# Patient Record
Sex: OTHER/UNKNOWN | Race: WHITE | NOT HISPANIC OR LATINO | Employment: UNEMPLOYED | URBAN - METROPOLITAN AREA
[De-identification: names, ages, dates, MRNs, and addresses within clinical notes are randomized per-mention and may not be internally consistent; named-entity substitution may affect disease eponyms.]

---

## 2019-01-18 ENCOUNTER — HOSPITAL ENCOUNTER (EMERGENCY)
Facility: OTHER | Age: 24
Discharge: HOME | End: 2019-01-18
Attending: EMERGENCY MEDICINE
Payer: MEDICAID

## 2019-01-18 VITALS
SYSTOLIC BLOOD PRESSURE: 121 MMHG | RESPIRATION RATE: 18 BRPM | HEART RATE: 89 BPM | HEIGHT: 67 IN | OXYGEN SATURATION: 99 % | TEMPERATURE: 98 F | DIASTOLIC BLOOD PRESSURE: 76 MMHG | WEIGHT: 159.38 LBS | BODY MASS INDEX: 25.01 KG/M2

## 2019-01-18 DIAGNOSIS — K62.89 PAIN, RECTAL: Primary | ICD-10-CM

## 2019-01-18 LAB
BILIRUB UR QL STRIP: NEGATIVE
CLARITY UR: CLEAR
COLOR UR: YELLOW
GLUCOSE UR QL STRIP: NEGATIVE
HGB UR QL STRIP: NEGATIVE
KETONES UR QL STRIP: NEGATIVE
LEUKOCYTE ESTERASE UR QL STRIP: NEGATIVE
NITRITE UR QL STRIP: NEGATIVE
PH UR STRIP: 6 [PH] (ref 5–8)
PROT UR QL STRIP: NEGATIVE
SP GR UR STRIP: <=1.005 (ref 1–1.03)
URN SPEC COLLECT METH UR: ABNORMAL
UROBILINOGEN UR STRIP-ACNC: NEGATIVE EU/DL

## 2019-01-18 PROCEDURE — 63600175 PHARM REV CODE 636 W HCPCS: Performed by: EMERGENCY MEDICINE

## 2019-01-18 PROCEDURE — 81003 URINALYSIS AUTO W/O SCOPE: CPT

## 2019-01-18 PROCEDURE — 25000003 PHARM REV CODE 250: Performed by: EMERGENCY MEDICINE

## 2019-01-18 PROCEDURE — 99283 EMERGENCY DEPT VISIT LOW MDM: CPT

## 2019-01-18 RX ORDER — QUETIAPINE FUMARATE 25 MG/1
TABLET, FILM COATED ORAL
Status: ON HOLD | COMMUNITY
End: 2019-06-19 | Stop reason: HOSPADM

## 2019-01-18 RX ORDER — IBUPROFEN 400 MG/1
400 TABLET ORAL
Status: COMPLETED | OUTPATIENT
Start: 2019-01-18 | End: 2019-01-18

## 2019-01-18 RX ORDER — FLUOXETINE HYDROCHLORIDE 40 MG/1
40 CAPSULE ORAL DAILY
COMMUNITY
End: 2019-06-13

## 2019-01-18 RX ORDER — AZITHROMYCIN 250 MG/1
1000 TABLET, FILM COATED ORAL
Status: COMPLETED | OUTPATIENT
Start: 2019-01-18 | End: 2019-01-18

## 2019-01-18 RX ORDER — ESTRADIOL 1 MG/1
2 TABLET ORAL 3 TIMES DAILY
COMMUNITY

## 2019-01-18 RX ORDER — CEFTRIAXONE 250 MG/1
250 INJECTION, POWDER, FOR SOLUTION INTRAMUSCULAR; INTRAVENOUS
Status: COMPLETED | OUTPATIENT
Start: 2019-01-18 | End: 2019-01-18

## 2019-01-18 RX ADMIN — AZITHROMYCIN 1000 MG: 250 TABLET, FILM COATED ORAL at 01:01

## 2019-01-18 RX ADMIN — IBUPROFEN 400 MG: 400 TABLET, FILM COATED ORAL at 01:01

## 2019-01-18 RX ADMIN — CEFTRIAXONE SODIUM 250 MG: 250 INJECTION, POWDER, FOR SOLUTION INTRAMUSCULAR; INTRAVENOUS at 01:01

## 2019-01-18 NOTE — ED PROVIDER NOTES
Encounter Date: 1/18/2019    SCRIBE #1 NOTE: I, Antonietta Kam, am scribing for, and in the presence of, Dr. Newton.       History     Chief Complaint   Patient presents with    rectal discharge     mucous discharge x a few weeks w/ some blood today and cloudy urine     Time seen by provider: 1:02 AM    This is a 23 y.o. male who presents with complaint of rectal discharge that began about two weeks ago. Patient reports rectal pain. She reports a small amount of rectal bleeding that began today. She reports pain worsens with bowel movements.She reports similar symptoms after having cut that became infected in the rectum. She denies fever, chills, abdominal pain, nausea, or vomiting. She notes being concerned about STD's. Of note, patient is taking hormone replacement therapy, and prefers to be called Arlin.       The history is provided by the patient.     Review of patient's allergies indicates:  No Known Allergies  Past Medical History:   Diagnosis Date    Depression     Hormone replacement therapy (HRT)      Past Surgical History:   Procedure Laterality Date    CHOLECYSTECTOMY       History reviewed. No pertinent family history.  Social History     Tobacco Use    Smoking status: Current Every Day Smoker    Smokeless tobacco: Never Used   Substance Use Topics    Alcohol use: No     Frequency: Never    Drug use: Yes     Types: Marijuana     Review of Systems   Constitutional: Negative for chills and fever.   HENT: Negative for sore throat.    Respiratory: Negative for cough and shortness of breath.    Cardiovascular: Negative for chest pain.   Gastrointestinal: Positive for anal bleeding and rectal pain. Negative for abdominal pain, nausea and vomiting.   Genitourinary: Negative for dysuria.   Musculoskeletal: Negative for back pain.   Skin: Negative for rash.   Neurological: Negative for weakness.       Physical Exam     Initial Vitals [01/18/19 0045]   BP Pulse Resp Temp SpO2   127/79 96 16 98.1 °F (36.7  °C) 99 %      MAP       --         Physical Exam    Nursing note and vitals reviewed.  Constitutional: He appears well-developed and well-nourished. No distress.   HENT:   Head: Normocephalic and atraumatic.   Mouth/Throat: Oropharynx is clear and moist.   Eyes: Conjunctivae and EOM are normal. Pupils are equal, round, and reactive to light.   Neck: Normal range of motion. Neck supple.   Cardiovascular: Normal rate, regular rhythm and normal heart sounds. Exam reveals no gallop and no friction rub.    No murmur heard.  Pulmonary/Chest: Breath sounds normal. No respiratory distress. He has no wheezes. He has no rhonchi. He has no rales.   Abdominal: Soft. There is no tenderness. There is no rebound and no guarding.   Genitourinary:   Genitourinary Comments: Rectal exam no tenderness. No rectal bleeding. Hemoccult negative.    Musculoskeletal: Normal range of motion. He exhibits no edema or tenderness.   Neurological: He is alert and oriented to person, place, and time.   Skin: Skin is warm and dry.   Psychiatric: He has a normal mood and affect. His behavior is normal. Judgment and thought content normal.         ED Course   Procedures  Labs Reviewed   URINALYSIS, REFLEX TO URINE CULTURE - Abnormal; Notable for the following components:       Result Value    Specific Gravity, UA <=1.005 (*)     All other components within normal limits    Narrative:     Preferred Collection Type->Urine, Clean Catch          Imaging Results    None          Medical Decision Making:   Initial Assessment:   Patient rectal discharge and concern for STD. Will treat with rocephin and Zithromax and recommend follow up with PCP.   Clinical Tests:   Lab Tests: Ordered and Reviewed  ED Management:  2:29 AM UA normal.               Attending Attestation:           Physician Attestation for Scribe:  Physician Attestation Statement for Scribe #1: I, Dr. Newton, reviewed documentation, as scribed by Antonietta Kam in my presence, and it is both  accurate and complete.                    Clinical Impression:     1. Pain, rectal                                 Walter Newton MD  01/18/19 4833

## 2019-01-18 NOTE — ED TRIAGE NOTES
"Pt arrived to ED with c/o mucous stool x several weeks. Pt states that he has had this in the past when he had an infection from a cut inside his rectum but the mucous is starting to turn yellow. Pt reports abdominal spasms when having a BM. Pt also reports "spots of blood" in stool earlier today. Pt denies fever, chills, n/v, sob, cp, dysuria.   "

## 2019-06-13 ENCOUNTER — HOSPITAL ENCOUNTER (EMERGENCY)
Facility: HOSPITAL | Age: 24
Discharge: PSYCHIATRIC OR SUBSTANCE USE FACILITY | End: 2019-06-13
Attending: EMERGENCY MEDICINE
Payer: MEDICAID

## 2019-06-13 ENCOUNTER — HOSPITAL ENCOUNTER (INPATIENT)
Facility: HOSPITAL | Age: 24
LOS: 6 days | Discharge: HOME | DRG: 885 | End: 2019-06-19
Attending: PSYCHIATRY & NEUROLOGY | Admitting: PSYCHIATRY & NEUROLOGY
Payer: MEDICAID

## 2019-06-13 VITALS
SYSTOLIC BLOOD PRESSURE: 127 MMHG | WEIGHT: 150 LBS | DIASTOLIC BLOOD PRESSURE: 62 MMHG | HEART RATE: 67 BPM | OXYGEN SATURATION: 97 % | HEIGHT: 67 IN | TEMPERATURE: 98 F | RESPIRATION RATE: 14 BRPM | BODY MASS INDEX: 23.54 KG/M2

## 2019-06-13 DIAGNOSIS — F19.10 POLYSUBSTANCE ABUSE: ICD-10-CM

## 2019-06-13 DIAGNOSIS — F17.210 CIGARETTE NICOTINE DEPENDENCE WITHOUT COMPLICATION: ICD-10-CM

## 2019-06-13 DIAGNOSIS — R45.89 SUICIDAL BEHAVIOR WITHOUT ATTEMPTED SELF-INJURY: Primary | ICD-10-CM

## 2019-06-13 DIAGNOSIS — F33.3 SEVERE EPISODE OF RECURRENT MAJOR DEPRESSIVE DISORDER, WITH PSYCHOTIC FEATURES: Primary | ICD-10-CM

## 2019-06-13 DIAGNOSIS — F32.A DEPRESSION WITH SUICIDAL IDEATION: ICD-10-CM

## 2019-06-13 DIAGNOSIS — R45.851 DEPRESSION WITH SUICIDAL IDEATION: ICD-10-CM

## 2019-06-13 DIAGNOSIS — Z78.9 MALE-TO-FEMALE TRANSGENDER PERSON: ICD-10-CM

## 2019-06-13 LAB
ALBUMIN SERPL BCP-MCNC: 3.9 G/DL (ref 3.5–5.2)
ALP SERPL-CCNC: 78 U/L (ref 55–135)
ALT SERPL W/O P-5'-P-CCNC: 11 U/L (ref 10–44)
AMPHET+METHAMPHET UR QL: NEGATIVE
ANION GAP SERPL CALC-SCNC: 8 MMOL/L (ref 8–16)
APAP SERPL-MCNC: <3 UG/ML (ref 10–20)
AST SERPL-CCNC: 13 U/L (ref 10–40)
BACTERIA #/AREA URNS AUTO: ABNORMAL /HPF
BARBITURATES UR QL SCN>200 NG/ML: NEGATIVE
BASOPHILS # BLD AUTO: 0.03 K/UL (ref 0–0.2)
BASOPHILS NFR BLD: 0.4 % (ref 0–1.9)
BENZODIAZ UR QL SCN>200 NG/ML: NEGATIVE
BILIRUB SERPL-MCNC: 0.4 MG/DL (ref 0.1–1)
BILIRUB UR QL STRIP: NEGATIVE
BUN SERPL-MCNC: 11 MG/DL (ref 6–20)
BZE UR QL SCN: NEGATIVE
CALCIUM SERPL-MCNC: 9.1 MG/DL (ref 8.7–10.5)
CANNABINOIDS UR QL SCN: NORMAL
CHLORIDE SERPL-SCNC: 104 MMOL/L (ref 95–110)
CLARITY UR REFRACT.AUTO: CLEAR
CO2 SERPL-SCNC: 27 MMOL/L (ref 23–29)
COLOR UR AUTO: YELLOW
CREAT SERPL-MCNC: 0.8 MG/DL (ref 0.5–1.4)
CREAT UR-MCNC: 105 MG/DL (ref 23–375)
DIFFERENTIAL METHOD: NORMAL
EOSINOPHIL # BLD AUTO: 0.1 K/UL (ref 0–0.5)
EOSINOPHIL NFR BLD: 1.2 % (ref 0–8)
ERYTHROCYTE [DISTWIDTH] IN BLOOD BY AUTOMATED COUNT: 13 % (ref 11.5–14.5)
EST. GFR  (AFRICAN AMERICAN): >60 ML/MIN/1.73 M^2
EST. GFR  (NON AFRICAN AMERICAN): >60 ML/MIN/1.73 M^2
ETHANOL SERPL-MCNC: <10 MG/DL
GLUCOSE SERPL-MCNC: 80 MG/DL (ref 70–110)
GLUCOSE UR QL STRIP: NEGATIVE
HCT VFR BLD AUTO: 43.7 % (ref 40–54)
HGB BLD-MCNC: 14.7 G/DL (ref 14–18)
HGB UR QL STRIP: NEGATIVE
IMM GRANULOCYTES # BLD AUTO: 0.02 K/UL (ref 0–0.04)
IMM GRANULOCYTES NFR BLD AUTO: 0.2 % (ref 0–0.5)
KETONES UR QL STRIP: NEGATIVE
LEUKOCYTE ESTERASE UR QL STRIP: ABNORMAL
LYMPHOCYTES # BLD AUTO: 3.1 K/UL (ref 1–4.8)
LYMPHOCYTES NFR BLD: 36.7 % (ref 18–48)
MCH RBC QN AUTO: 29.5 PG (ref 27–31)
MCHC RBC AUTO-ENTMCNC: 33.6 G/DL (ref 32–36)
MCV RBC AUTO: 88 FL (ref 82–98)
METHADONE UR QL SCN>300 NG/ML: NEGATIVE
MICROSCOPIC COMMENT: ABNORMAL
MONOCYTES # BLD AUTO: 0.6 K/UL (ref 0.3–1)
MONOCYTES NFR BLD: 7.1 % (ref 4–15)
NEUTROPHILS # BLD AUTO: 4.5 K/UL (ref 1.8–7.7)
NEUTROPHILS NFR BLD: 54.4 % (ref 38–73)
NITRITE UR QL STRIP: NEGATIVE
NRBC BLD-RTO: 0 /100 WBC
OPIATES UR QL SCN: NEGATIVE
PCP UR QL SCN>25 NG/ML: NEGATIVE
PH UR STRIP: 5 [PH] (ref 5–8)
PLATELET # BLD AUTO: 237 K/UL (ref 150–350)
PMV BLD AUTO: 10.7 FL (ref 9.2–12.9)
POTASSIUM SERPL-SCNC: 3.7 MMOL/L (ref 3.5–5.1)
PROT SERPL-MCNC: 6.9 G/DL (ref 6–8.4)
PROT UR QL STRIP: NEGATIVE
RBC # BLD AUTO: 4.99 M/UL (ref 4.6–6.2)
RBC #/AREA URNS AUTO: 0 /HPF (ref 0–4)
SODIUM SERPL-SCNC: 139 MMOL/L (ref 136–145)
SP GR UR STRIP: 1.01 (ref 1–1.03)
SQUAMOUS #/AREA URNS AUTO: 3 /HPF
TOXICOLOGY INFORMATION: NORMAL
TSH SERPL DL<=0.005 MIU/L-ACNC: 1.39 UIU/ML (ref 0.4–4)
URN SPEC COLLECT METH UR: ABNORMAL
WBC # BLD AUTO: 8.3 K/UL (ref 3.9–12.7)
WBC #/AREA URNS AUTO: 8 /HPF (ref 0–5)

## 2019-06-13 PROCEDURE — 99285 PR EMERGENCY DEPT VISIT,LEVEL V: ICD-10-PCS | Mod: ,,, | Performed by: EMERGENCY MEDICINE

## 2019-06-13 PROCEDURE — 99285 EMERGENCY DEPT VISIT HI MDM: CPT

## 2019-06-13 PROCEDURE — 85025 COMPLETE CBC W/AUTO DIFF WBC: CPT

## 2019-06-13 PROCEDURE — 80320 DRUG SCREEN QUANTALCOHOLS: CPT

## 2019-06-13 PROCEDURE — 80307 DRUG TEST PRSMV CHEM ANLYZR: CPT

## 2019-06-13 PROCEDURE — 84443 ASSAY THYROID STIM HORMONE: CPT

## 2019-06-13 PROCEDURE — 11400000 HC PSYCH PRIVATE ROOM

## 2019-06-13 PROCEDURE — 80053 COMPREHEN METABOLIC PANEL: CPT

## 2019-06-13 PROCEDURE — 80329 ANALGESICS NON-OPIOID 1 OR 2: CPT

## 2019-06-13 PROCEDURE — 81001 URINALYSIS AUTO W/SCOPE: CPT

## 2019-06-13 PROCEDURE — 99285 EMERGENCY DEPT VISIT HI MDM: CPT | Mod: ,,, | Performed by: EMERGENCY MEDICINE

## 2019-06-13 RX ORDER — HYDROXYZINE PAMOATE 50 MG/1
50 CAPSULE ORAL NIGHTLY PRN
Status: DISCONTINUED | OUTPATIENT
Start: 2019-06-13 | End: 2019-06-19 | Stop reason: HOSPADM

## 2019-06-13 RX ORDER — OLANZAPINE 10 MG/1
10 TABLET ORAL EVERY 8 HOURS PRN
Status: DISCONTINUED | OUTPATIENT
Start: 2019-06-13 | End: 2019-06-19 | Stop reason: HOSPADM

## 2019-06-13 RX ORDER — CARVEDILOL 12.5 MG/1
25 TABLET ORAL
Status: DISCONTINUED | OUTPATIENT
Start: 2019-06-13 | End: 2019-06-13

## 2019-06-13 RX ORDER — LOPERAMIDE HYDROCHLORIDE 2 MG/1
2 CAPSULE ORAL
Status: DISCONTINUED | OUTPATIENT
Start: 2019-06-13 | End: 2019-06-19 | Stop reason: HOSPADM

## 2019-06-13 RX ORDER — FLUOXETINE HYDROCHLORIDE 20 MG/1
20 CAPSULE ORAL DAILY
Status: ON HOLD | COMMUNITY
End: 2019-06-19 | Stop reason: HOSPADM

## 2019-06-13 RX ORDER — FOLIC ACID 1 MG/1
1 TABLET ORAL DAILY
Status: DISCONTINUED | OUTPATIENT
Start: 2019-06-14 | End: 2019-06-19 | Stop reason: HOSPADM

## 2019-06-13 RX ORDER — MAG HYDROX/ALUMINUM HYD/SIMETH 200-200-20
30 SUSPENSION, ORAL (FINAL DOSE FORM) ORAL EVERY 6 HOURS PRN
Status: DISCONTINUED | OUTPATIENT
Start: 2019-06-13 | End: 2019-06-19 | Stop reason: HOSPADM

## 2019-06-13 RX ORDER — OLANZAPINE 10 MG/2ML
10 INJECTION, POWDER, FOR SOLUTION INTRAMUSCULAR EVERY 8 HOURS PRN
Status: DISCONTINUED | OUTPATIENT
Start: 2019-06-13 | End: 2019-06-19 | Stop reason: HOSPADM

## 2019-06-13 RX ORDER — ACETAMINOPHEN 325 MG/1
650 TABLET ORAL EVERY 6 HOURS PRN
Status: DISCONTINUED | OUTPATIENT
Start: 2019-06-13 | End: 2019-06-19 | Stop reason: HOSPADM

## 2019-06-13 RX ORDER — DOCUSATE SODIUM 100 MG/1
100 CAPSULE, LIQUID FILLED ORAL DAILY PRN
Status: DISCONTINUED | OUTPATIENT
Start: 2019-06-13 | End: 2019-06-19 | Stop reason: HOSPADM

## 2019-06-13 RX ORDER — IBUPROFEN 200 MG
1 TABLET ORAL DAILY PRN
Status: DISCONTINUED | OUTPATIENT
Start: 2019-06-13 | End: 2019-06-19 | Stop reason: HOSPADM

## 2019-06-13 RX ORDER — LISINOPRIL 10 MG/1
10 TABLET ORAL
Status: DISCONTINUED | OUTPATIENT
Start: 2019-06-13 | End: 2019-06-13

## 2019-06-13 NOTE — ED NOTES
Patient laying on stretcher with significant other.  Calm and cooperative. Denies any pain or any other needs at this time. VSS.  Awaiting transport---ETA 6pm. Sitter at bedside in direct visual contact documenting per flow sheet.  Will continue to monitor.

## 2019-06-13 NOTE — ED TRIAGE NOTES
"Pt states that they have been having suicidal thoughts the past few days. States they have been having thoughts of hanging themselves, and have attempted to do so in the past, due to feel ing depressed and "felling like I'm about to crawl out of my skin". Pt girlfriend and girlfriends mother in room with pt.. Pt changed into blue scrubs, jewelry (ear rings and lip ring) placed into bag and given to girlfriends mother on pt request. Pt belongings placed in bag with pt label and put into storage area. Pt signed Notification of Rights and belongings inventory list. Pt in no acute distress at this time.   "

## 2019-06-13 NOTE — ED NOTES
Patient accepted by Deion Shaw at [facility] ([address], [place], [place]) for the service of Dr. Snell.  Report to be called to [phone].

## 2019-06-13 NOTE — ED NOTES
Admit packet faxed to [facility], [facility], [facility], and [facility].

## 2019-06-13 NOTE — ED NOTES
Patient laying on stretcher with significant other.  Calm and cooperative. Denies any pain or any other needs at this time.  Aware of placement at [facility].  Sitter at bedside in direct visual contact documenting per flow sheet.  Will continue to monitor.

## 2019-06-13 NOTE — ED NOTES
Pt remains with RN in triage at this time for 1:1 observation. Patient is calm and cooperative. A&Ox4 and following commands. Charge RN aware of patient.

## 2019-06-13 NOTE — ED NOTES
Patient laying on stretcher with significant other.  Calm and cooperative. Denies any pain or any other needs at this time.  Awaiting transport. Sitter at bedside in direct visual contact documenting per flow sheet.  Will continue to monitor.

## 2019-06-13 NOTE — ED NOTES
[facility] spoke with [facility] and was told a bed might be available at [facility] on [address].  Patient will require a psych consult.

## 2019-06-13 NOTE — ED PROVIDER NOTES
Encounter Date: 6/13/2019    SCRIBE #1 NOTE: I, Yessi Santiago, am scribing for, and in the presence of,  Dr. Nascimento. I have scribed the following portions of the note - Other sections scribed: HPI, ROS, PE, MDM.       History     Chief Complaint   Patient presents with    Suicidal     pt reports having thought to self harm for a few months. pt reports attempting to hang himself six months ago. pt reports having the same plan today. pt is calm and cooperative in triage.      Time patient was seen by the provider: 12:50 PM      The patient is a 23 y.o. male to female transgender with co-morbidities including: Hormone replacement, Depression who presents to the ED with a complaint of suicidal ideation. Reports having thoughts of self-harm for a few months, and had a previous suicide attempt 6 months ago, her plan then being hanging. She states she had the same plan for today; however, she does not want to put her partner or family through any pain. Denies pills or self-harm today. Physically she does not have any complaints at this time, denies fever, cough, abd pain, chest pain, shortness of breath. She is compliant with her medications and is taking Geodon and Prozac for her depression. No HI. Is not hearing voices today, but states she last heard them 3 days ago. No hallucinations.       The history is provided by the patient and medical records.     Review of patient's allergies indicates:  No Known Allergies  Past Medical History:   Diagnosis Date    Depression     Hormone replacement therapy (HRT)      Past Surgical History:   Procedure Laterality Date    CHOLECYSTECTOMY       No family history on file.  Social History     Tobacco Use    Smoking status: Current Every Day Smoker    Smokeless tobacco: Never Used   Substance Use Topics    Alcohol use: No     Frequency: Never    Drug use: Yes     Types: Marijuana     Review of Systems   Constitutional:  No Fever, No Chills,   Eyes: No Vision  Changes  ENT/Mouth: No sore throat, No rhinorrhea  Cardiovascular:  No Chest Pain, No Palpitations  Respiratory:  No Cough, No SOB  Gastrointestinal:  No Nausea, No Vomiting, No Diarrhea, No abdo pain.  Genitourinary:  No  pain, No dysuria   Musculoskeletal:  No Arthralgias, No Back Pain, No Neck Pain, No recent trauma.  Skin:  No skin Lesions  Neuro:  No Weakness, No Numbness, No Paresthesias, No Dizziness, No Headache  Psychiatric: Suicidal ideas    Physical Exam     Initial Vitals [06/13/19 1223]   BP Pulse Resp Temp SpO2   129/82 88 16 98.3 °F (36.8 °C) 97 %      MAP       --         Physical Exam    Nursing note and vitals reviewed.        Physical Exam:  GENERAL APPEARANCE: Well developed, well nourished, in no acute distress.  HENT: Normocephalic, atraumatic    EYES: Sclerae anicteric   NECK: Supple, no thyroid enlargement  CARDIOVASCULAR: Regular rate and rhythm without any murmurs, gallops, rubs.  LUNGS: Speaking in full sentences. Breathing comfortably. Auscultation of the lungs revealed normal breath sounds b/l  ABDOMEN: Soft and nontender, no masses, no rebound or guarding   NEUROLOGIC: Alert, interacting normally. No facial droop.   MSK: Moving all four extremities.  Skin: Warm and dry. No visible rash on exposed areas of skin.    Psych:  Flat affect, calm and cooperative.    ED Course   Procedures  Labs Reviewed   CBC W/ AUTO DIFFERENTIAL   COMPREHENSIVE METABOLIC PANEL   TSH   URINALYSIS, REFLEX TO URINE CULTURE   DRUG SCREEN PANEL, URINE EMERGENCY   ALCOHOL,MEDICAL (ETHANOL)   ACETAMINOPHEN LEVEL          Imaging Results    None          Medical Decision Making:   History:   Old Medical Records: I decided to obtain old medical records.  Old Records Summarized: records from clinic visits and records from previous admission(s).  Initial Assessment:   Suicidal with plan of hanging herself. No self-harm gestures at this time. Exam is benign. Patient is calm and cooperative. Will PEC and send screening  labs for medical clearance and transfer patient out as there are no beds at [facility].   Clinical Tests:   Lab Tests: Ordered and Reviewed  ED Management:  Update:  Labs are unremarkable. Vitals benign.  Patient is medically cleared at this time for psychiatric evaluation.  No beds available at [facility], is for transfer to outside facility.    MDM Complexity Points:   Problem Points:  1.New problem, with no additional ED work-up planned (maximum of 1) - suicidal ideation    Data Points:  Review or order clinical lab tests and Decision to obtain old records (in the EHR)    Risk:  High Risk              Scribe Attestation:   Scribe #1: I performed the above scribed service and the documentation accurately describes the services I performed. I attest to the accuracy of the note.               Clinical Impression:   No diagnosis found.                             Frank Nascimento MD  06/13/19 3711

## 2019-06-14 PROBLEM — F19.10 POLYSUBSTANCE ABUSE: Status: ACTIVE | Noted: 2019-06-14

## 2019-06-14 PROBLEM — Z78.9 MALE-TO-FEMALE TRANSGENDER PERSON: Status: ACTIVE | Noted: 2019-06-14

## 2019-06-14 PROBLEM — F17.210 CIGARETTE NICOTINE DEPENDENCE WITHOUT COMPLICATION: Status: ACTIVE | Noted: 2019-06-14

## 2019-06-14 LAB
CHOLEST SERPL-MCNC: 161 MG/DL (ref 120–199)
CHOLEST/HDLC SERPL: 2.9 {RATIO} (ref 2–5)
ESTIMATED AVG GLUCOSE: 103 MG/DL (ref 68–131)
HBA1C MFR BLD HPLC: 5.2 % (ref 4–5.6)
HDLC SERPL-MCNC: 56 MG/DL (ref 40–75)
HDLC SERPL: 34.8 % (ref 20–50)
LDLC SERPL CALC-MCNC: 89.8 MG/DL (ref 63–159)
NONHDLC SERPL-MCNC: 105 MG/DL
TRIGL SERPL-MCNC: 76 MG/DL (ref 30–150)

## 2019-06-14 PROCEDURE — 99223 PR INITIAL HOSPITAL CARE,LEVL III: ICD-10-PCS | Mod: ,,, | Performed by: NURSE PRACTITIONER

## 2019-06-14 PROCEDURE — 99223 1ST HOSP IP/OBS HIGH 75: CPT | Mod: ,,, | Performed by: NURSE PRACTITIONER

## 2019-06-14 PROCEDURE — 99223 PR INITIAL HOSPITAL CARE,LEVL III: ICD-10-PCS | Mod: SA,HB,S$PBB, | Performed by: PSYCHIATRY & NEUROLOGY

## 2019-06-14 PROCEDURE — 11400000 HC PSYCH PRIVATE ROOM

## 2019-06-14 PROCEDURE — 83036 HEMOGLOBIN GLYCOSYLATED A1C: CPT

## 2019-06-14 PROCEDURE — 90833 PR PSYCHOTHERAPY W/PATIENT W/E&M, 30 MIN (ADD ON): ICD-10-PCS | Mod: SA,HB,S$PBB, | Performed by: PSYCHIATRY & NEUROLOGY

## 2019-06-14 PROCEDURE — 80061 LIPID PANEL: CPT

## 2019-06-14 PROCEDURE — 99223 1ST HOSP IP/OBS HIGH 75: CPT | Mod: SA,HB,S$PBB, | Performed by: PSYCHIATRY & NEUROLOGY

## 2019-06-14 PROCEDURE — 90833 PSYTX W PT W E/M 30 MIN: CPT | Mod: SA,HB,S$PBB, | Performed by: PSYCHIATRY & NEUROLOGY

## 2019-06-14 PROCEDURE — 25000003 PHARM REV CODE 250: Performed by: PSYCHIATRY & NEUROLOGY

## 2019-06-14 PROCEDURE — 36415 COLL VENOUS BLD VENIPUNCTURE: CPT

## 2019-06-14 RX ORDER — SPIRONOLACTONE 25 MG/1
100 TABLET ORAL 3 TIMES DAILY
Status: DISCONTINUED | OUTPATIENT
Start: 2019-06-17 | End: 2019-06-15

## 2019-06-14 RX ORDER — ESTRADIOL 0.5 MG/1
2 TABLET ORAL 3 TIMES DAILY
Status: DISCONTINUED | OUTPATIENT
Start: 2019-06-17 | End: 2019-06-15

## 2019-06-14 RX ORDER — ZIPRASIDONE HYDROCHLORIDE 40 MG/1
40 CAPSULE ORAL 2 TIMES DAILY WITH MEALS
Status: DISCONTINUED | OUTPATIENT
Start: 2019-06-14 | End: 2019-06-18

## 2019-06-14 RX ORDER — FLUOXETINE HYDROCHLORIDE 20 MG/1
20 CAPSULE ORAL DAILY
Status: DISCONTINUED | OUTPATIENT
Start: 2019-06-14 | End: 2019-06-16

## 2019-06-14 RX ADMIN — FLUOXETINE 20 MG: 20 CAPSULE ORAL at 09:06

## 2019-06-14 RX ADMIN — ZIPRASIDONE HYDROCHLORIDE 40 MG: 40 CAPSULE ORAL at 04:06

## 2019-06-14 RX ADMIN — THERA TABS 1 TABLET: TAB at 08:06

## 2019-06-14 RX ADMIN — FOLIC ACID 1 MG: 1 TABLET ORAL at 08:06

## 2019-06-14 RX ADMIN — ZIPRASIDONE HYDROCHLORIDE 40 MG: 40 CAPSULE ORAL at 10:06

## 2019-06-14 NOTE — PLAN OF CARE
"Problem: Adult Behavioral Health Plan of Care  Goal: Patient-Specific Goal (Individualization)  Outcome: Ongoing (interventions implemented as appropriate)  Pt presented to [facility] with feeling suicidal and thinking of hanging self, pt PEC'D, accepted by Dr. Derain Snell, pt up to unit via security and spd  and attendee, property and body search performed and no paraphernalia found, pt flat affect and hesitent to make eye contact, unkempt, sad, pt states "having thoughts of harming myself because I have these voices in my head that tell me to harm myself" pt had previous suicide attempt 6 months ago her plan was to hang herself in garage, pt contracted for safety, pt PMX of Hormone replacement, Depression, states she is compliant with taking her Geodon and Prozac and her Estrogen therapy, pt given tobacco and substance abuse cessation education and handout, pt to receive outpatient referral upon discharge for tobacco and substance abuse cessation upon discharge, oriented pt to unit, gave supper tray, pt out on unit in activity room, safety precautions maintained, will continue to monitor        "

## 2019-06-14 NOTE — ASSESSMENT & PLAN NOTE
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

## 2019-06-14 NOTE — PROGRESS NOTES
"   06/14/19 1150   [facility] Group Therapy   Group Name Leisure Education  (1:1 with staff)   Specific Interventions Coping Skills Training  (defining leisure and benefits)   Participation Level Active;Sharing   Participation Quality Cooperative   Insight/Motivation Good   Affect/Mood Display Appropriate   Cognition Alert   Psychomotor WNL   Patient defined leisure as "to relax." Leisure means "painting, watching movies, relaxing with my partner." Patient states when he doesn't incorporate leisure into his life "I won't get to enjoy myself. It creates anxiety and stress."  "

## 2019-06-14 NOTE — ASSESSMENT & PLAN NOTE
Being treated with HRT  Aldactone and estradiol per home doses- need to verify with pharmacy that patient was taking these doses prior to admission   Discussed with Dr. Vance

## 2019-06-14 NOTE — PLAN OF CARE
Problem: Adult Behavioral Health Plan of Care  Goal: Plan of Care Review  Outcome: Ongoing (interventions implemented as appropriate)  Pt is cooperative and compliant with meds.  Still reports depression and anxiety.  Pt is disheveled.  Denies any current S/I or plan.  Somewhat withdrawn but will get out of bed when prompted.  Attending activity group at this time.  No acute distress apparent at this time, will continue to monitor.

## 2019-06-14 NOTE — PLAN OF CARE
Problem: Adult Behavioral Health Plan of Care  Goal: Optimized Coping Skills in Response to Life Stressors    Intervention: Promote Effective Coping Strategies  Behavior:  Patient did not attend psychotherapy group today. She was isolating in her room when invited to attend group.  met with her after group to check on her. She said that she was too tired to attend group.    Intervention:  Personal values were discussed in psychotherapy group this date and how personal values plays a part in patients lives. Patients identified values that are important to them using handout P/C/P - 7.1 from the Group Therapy for Substance Abuse, second edition, 2016. A Stages of Change Manual. Group discussion was had about patients values, behaviors that may not line up with their values, and how they might make some changes.    Response:  Patient did not attend psychotherapy group this date.    Plan:  To continue to encourage patient to attend group psychotherapy and to learn more about ways that they might change to live in more accord with their values.
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
## 2019-06-14 NOTE — PLAN OF CARE
Problem: Adult Behavioral Health Plan of Care  Goal: Rounds/Family Conference  TREATMENT TEAM UPDATE      Chief Complaint:  Patient was admitted due to suicidal ideation.      Current:  Patient is dressed in a hospital gown and looks very disheveled.      Plan:  Continue to assess and obtain collateral if patient is willing.

## 2019-06-14 NOTE — SUBJECTIVE & OBJECTIVE
Past Medical History:   Diagnosis Date    Depression     Hallucination     Hormone replacement therapy (HRT)        Past Surgical History:   Procedure Laterality Date    CHOLECYSTECTOMY         Review of patient's allergies indicates:  No Known Allergies    Current Facility-Administered Medications on File Prior to Encounter   Medication    [DISCONTINUED] carvedilol tablet 25 mg    [DISCONTINUED] lisinopril tablet 10 mg     Current Outpatient Medications on File Prior to Encounter   Medication Sig    estradiol (ESTRACE) 1 MG tablet Take 2 mg by mouth 3 (three) times daily.    FLUoxetine 20 MG capsule Take 20 mg by mouth once daily.    QUEtiapine (SEROQUEL) 25 MG Tab Take by mouth.    SPIRONOLACTONE ORAL Take 100 mg by mouth 3 (three) times daily.     Family History     None        Tobacco Use    Smoking status: Current Every Day Smoker     Packs/day: 0.50     Years: 5.00     Pack years: 2.50    Smokeless tobacco: Never Used   Substance and Sexual Activity    Alcohol use: No     Frequency: Never    Drug use: Yes     Types: Marijuana, Methamphetamines    Sexual activity: Not on file     Review of Systems   Constitutional: Negative for chills and fever.   HENT: Negative for congestion and sore throat.    Respiratory: Negative for cough, chest tightness and shortness of breath.    Cardiovascular: Negative for chest pain, palpitations and leg swelling.   Gastrointestinal: Negative for abdominal pain, diarrhea, nausea and vomiting.   Genitourinary: Negative for flank pain, frequency and hematuria.   Musculoskeletal: Negative for back pain and neck pain.   Skin: Negative for rash and wound.   Neurological: Negative for dizziness, seizures, syncope and headaches.   Psychiatric/Behavioral: Positive for dysphoric mood and suicidal ideas. Negative for agitation, confusion and self-injury.     Objective:     Vital Signs (Most Recent):  Temp: 97.1 °F (36.2 °C) (06/14/19 1356)  Pulse: (!) 58 (06/14/19 1356)  Resp:  18 (06/14/19 1356)  BP: (!) 85/50 (06/14/19 1356) Vital Signs (24h Range):  Temp:  [96.1 °F (35.6 °C)-97.7 °F (36.5 °C)] 97.1 °F (36.2 °C)  Pulse:  [52-73] 58  Resp:  [14-18] 18  SpO2:  [97 %] 97 %  BP: ()/(50-65) 85/50     Weight: 74.8 kg (164 lb 14.5 oz)  Body mass index is 25.83 kg/m².    Physical Exam   Constitutional: He is oriented to person, place, and time. He appears well-developed and well-nourished. No distress.   HENT:   Head: Normocephalic and atraumatic.   Eyes: Pupils are equal, round, and reactive to light.   Neck: No thyromegaly present.   Cardiovascular: Normal rate, regular rhythm, normal heart sounds and intact distal pulses.   No murmur heard.  Pulmonary/Chest: Effort normal and breath sounds normal. No respiratory distress. He has no wheezes. He has no rales.   Abdominal: Soft. Bowel sounds are normal. He exhibits no distension and no mass. There is no tenderness.   Musculoskeletal: Normal range of motion. He exhibits no edema or deformity.   Lymphadenopathy:     He has no cervical adenopathy.   Neurological: He is alert and oriented to person, place, and time.   CN II visual fields full to confrontation  CN III, Iv, VI- pupils ERRL   CN III- no palsy  Nystagmus; none   Diplopia- none  ophthalmoparesis- none  CN V- facial sensation intact  CN VII- facial expression full and symmetric  CNVIII- normal  CN IV-Normal  CN X- normal  CN XI- Normal   CN XII normal      Skin: Skin is warm and dry. No rash noted. No erythema.   Nursing note and vitals reviewed.      Significant Labs:   UPT  No results found for this or any previous visit.  U/A  No results found for this or any previous visit.  UDS  Results for orders placed or performed during the hospital encounter of 06/13/19   Drug screen panel, emergency   Result Value Ref Range    Benzodiazepines Negative     Methadone metabolites Negative     Cocaine (Metab.) Negative     Opiate Scrn, Ur Negative     Barbiturate Screen, Ur Negative      Amphetamine Screen, Ur Negative     THC Presumptive Positive     Phencyclidine Negative     Creatinine, Random Ur 105.0 23.0 - 375.0 mg/dL    Toxicology Information SEE COMMENT      CBC  Results for orders placed or performed during the hospital encounter of 06/13/19   CBC auto differential   Result Value Ref Range    WBC 8.30 3.90 - 12.70 K/uL    RBC 4.99 4.60 - 6.20 M/uL    Hemoglobin 14.7 14.0 - 18.0 g/dL    Hematocrit 43.7 40.0 - 54.0 %    Mean Corpuscular Volume 88 82 - 98 fL    Mean Corpuscular Hemoglobin 29.5 27.0 - 31.0 pg    Mean Corpuscular Hemoglobin Conc 33.6 32.0 - 36.0 g/dL    RDW 13.0 11.5 - 14.5 %    Platelets 237 150 - 350 K/uL    MPV 10.7 9.2 - 12.9 fL    Immature Granulocytes 0.2 0.0 - 0.5 %    Gran # (ANC) 4.5 1.8 - 7.7 K/uL    Immature Grans (Abs) 0.02 0.00 - 0.04 K/uL    Lymph # 3.1 1.0 - 4.8 K/uL    Mono # 0.6 0.3 - 1.0 K/uL    Eos # 0.1 0.0 - 0.5 K/uL    Baso # 0.03 0.00 - 0.20 K/uL    nRBC 0 0 /100 WBC    Gran% 54.4 38.0 - 73.0 %    Lymph% 36.7 18.0 - 48.0 %    Mono% 7.1 4.0 - 15.0 %    Eosinophil% 1.2 0.0 - 8.0 %    Basophil% 0.4 0.0 - 1.9 %    Differential Method Automated      CMP  Results for orders placed or performed during the hospital encounter of 06/13/19   Comprehensive metabolic panel   Result Value Ref Range    Sodium 139 136 - 145 mmol/L    Potassium 3.7 3.5 - 5.1 mmol/L    Chloride 104 95 - 110 mmol/L    CO2 27 23 - 29 mmol/L    Glucose 80 70 - 110 mg/dL    BUN, Bld 11 6 - 20 mg/dL    Creatinine 0.8 0.5 - 1.4 mg/dL    Calcium 9.1 8.7 - 10.5 mg/dL    Total Protein 6.9 6.0 - 8.4 g/dL    Albumin 3.9 3.5 - 5.2 g/dL    Total Bilirubin 0.4 0.1 - 1.0 mg/dL    Alkaline Phosphatase 78 55 - 135 U/L    AST 13 10 - 40 U/L    ALT 11 10 - 44 U/L    Anion Gap 8 8 - 16 mmol/L    eGFR if African American >60.0 >60 mL/min/1.73 m^2    eGFR if non African American >60.0 >60 mL/min/1.73 m^2     TSH  Results for orders placed or performed during the hospital encounter of 06/13/19   TSH   Result  Value Ref Range    TSH 1.388 0.400 - 4.000 uIU/mL     ETOH  Results for orders placed or performed during the hospital encounter of 06/13/19   Ethanol   Result Value Ref Range    Alcohol, Medical, Serum <10 <10 mg/dL     Salicylate  No results found for this or any previous visit.  Acetaminophen  Results for orders placed or performed during the hospital encounter of 06/13/19   Acetaminophen level   Result Value Ref Range    Acetaminophen (Tylenol), Serum <3.0 (L) 10.0 - 20.0 ug/mL             Significant Imaging: none

## 2019-06-14 NOTE — CONSULTS
[facility] Medicine  Consult Note    Patient Name: Nicola Valdez  MRN: [id number]  Admission Date: 6/13/2019  Hospital Length of Stay: 1 days  Attending Physician: Derian Snell MD   Primary Care Provider: Primary Doctor No           Patient information was obtained from patient and ER records.     Inpatient consult to [facility] for History and Physical  Consult performed by: Nani Harrison NP  Consult ordered by: Derian Snell MD        Subjective:     Principal Problem: <principal problem not specified>    Chief Complaint: No chief complaint on file.       HPI: Nicola Valdez is a 23 y.o. male with a past psychiatric history of depression, currently admitted to the inpatient unit with the following chief complaint: suicidal ideation  UDS + THC  Apparently transitioning to female with HRT ; pt not very talkative and difficult to get information but reports was taking estradiol and aldactone   Patient is from [facility] and has been here for about six months.  She was homeless there so he came here.  She works as a  and lives with his girlfriend and her mother.  She was recently using methamphetamine and marijuana.      Past Medical History:   Diagnosis Date    Depression     Hallucination     Hormone replacement therapy (HRT)        Past Surgical History:   Procedure Laterality Date    CHOLECYSTECTOMY         Review of patient's allergies indicates:  No Known Allergies    Current Facility-Administered Medications on File Prior to Encounter   Medication    [DISCONTINUED] carvedilol tablet 25 mg    [DISCONTINUED] lisinopril tablet 10 mg     Current Outpatient Medications on File Prior to Encounter   Medication Sig    estradiol (ESTRACE) 1 MG tablet Take 2 mg by mouth 3 (three) times daily.    FLUoxetine 20 MG capsule Take 20 mg by mouth once daily.    QUEtiapine (SEROQUEL) 25 MG Tab Take by mouth.    SPIRONOLACTONE ORAL Take 100 mg by mouth 3  (three) times daily.     Family History     None        Tobacco Use    Smoking status: Current Every Day Smoker     Packs/day: 0.50     Years: 5.00     Pack years: 2.50    Smokeless tobacco: Never Used   Substance and Sexual Activity    Alcohol use: No     Frequency: Never    Drug use: Yes     Types: Marijuana, Methamphetamines    Sexual activity: Not on file     Review of Systems   Constitutional: Negative for chills and fever.   HENT: Negative for congestion and sore throat.    Respiratory: Negative for cough, chest tightness and shortness of breath.    Cardiovascular: Negative for chest pain, palpitations and leg swelling.   Gastrointestinal: Negative for abdominal pain, diarrhea, nausea and vomiting.   Genitourinary: Negative for flank pain, frequency and hematuria.   Musculoskeletal: Negative for back pain and neck pain.   Skin: Negative for rash and wound.   Neurological: Negative for dizziness, seizures, syncope and headaches.   Psychiatric/Behavioral: Positive for dysphoric mood and suicidal ideas. Negative for agitation, confusion and self-injury.     Objective:     Vital Signs (Most Recent):  Temp: 97.1 °F (36.2 °C) (06/14/19 1356)  Pulse: (!) 58 (06/14/19 1356)  Resp: 18 (06/14/19 1356)  BP: (!) 85/50 (06/14/19 1356) Vital Signs (24h Range):  Temp:  [96.1 °F (35.6 °C)-97.7 °F (36.5 °C)] 97.1 °F (36.2 °C)  Pulse:  [52-73] 58  Resp:  [14-18] 18  SpO2:  [97 %] 97 %  BP: ()/(50-65) 85/50     Weight: 74.8 kg (164 lb 14.5 oz)  Body mass index is 25.83 kg/m².    Physical Exam   Constitutional: He is oriented to person, place, and time. He appears well-developed and well-nourished. No distress.   HENT:   Head: Normocephalic and atraumatic.   Eyes: Pupils are equal, round, and reactive to light.   Neck: No thyromegaly present.   Cardiovascular: Normal rate, regular rhythm, normal heart sounds and intact distal pulses.   No murmur heard.  Pulmonary/Chest: Effort normal and breath sounds normal. No  respiratory distress. He has no wheezes. He has no rales.   Abdominal: Soft. Bowel sounds are normal. He exhibits no distension and no mass. There is no tenderness.   Musculoskeletal: Normal range of motion. He exhibits no edema or deformity.   Lymphadenopathy:     He has no cervical adenopathy.   Neurological: He is alert and oriented to person, place, and time.   CN II visual fields full to confrontation  CN III, Iv, VI- pupils ERRL   CN III- no palsy  Nystagmus; none   Diplopia- none  ophthalmoparesis- none  CN V- facial sensation intact  CN VII- facial expression full and symmetric  CNVIII- normal  CN IV-Normal  CN X- normal  CN XI- Normal   CN XII normal      Skin: Skin is warm and dry. No rash noted. No erythema.   Nursing note and vitals reviewed.      Significant Labs:   UPT  No results found for this or any previous visit.  U/A  No results found for this or any previous visit.  UDS  Results for orders placed or performed during the hospital encounter of 06/13/19   Drug screen panel, emergency   Result Value Ref Range    Benzodiazepines Negative     Methadone metabolites Negative     Cocaine (Metab.) Negative     Opiate Scrn, Ur Negative     Barbiturate Screen, Ur Negative     Amphetamine Screen, Ur Negative     THC Presumptive Positive     Phencyclidine Negative     Creatinine, Random Ur 105.0 23.0 - 375.0 mg/dL    Toxicology Information SEE COMMENT      CBC  Results for orders placed or performed during the hospital encounter of 06/13/19   CBC auto differential   Result Value Ref Range    WBC 8.30 3.90 - 12.70 K/uL    RBC 4.99 4.60 - 6.20 M/uL    Hemoglobin 14.7 14.0 - 18.0 g/dL    Hematocrit 43.7 40.0 - 54.0 %    Mean Corpuscular Volume 88 82 - 98 fL    Mean Corpuscular Hemoglobin 29.5 27.0 - 31.0 pg    Mean Corpuscular Hemoglobin Conc 33.6 32.0 - 36.0 g/dL    RDW 13.0 11.5 - 14.5 %    Platelets 237 150 - 350 K/uL    MPV 10.7 9.2 - 12.9 fL    Immature Granulocytes 0.2 0.0 - 0.5 %    Gran # (ANC) 4.5 1.8 -  7.7 K/uL    Immature Grans (Abs) 0.02 0.00 - 0.04 K/uL    Lymph # 3.1 1.0 - 4.8 K/uL    Mono # 0.6 0.3 - 1.0 K/uL    Eos # 0.1 0.0 - 0.5 K/uL    Baso # 0.03 0.00 - 0.20 K/uL    nRBC 0 0 /100 WBC    Gran% 54.4 38.0 - 73.0 %    Lymph% 36.7 18.0 - 48.0 %    Mono% 7.1 4.0 - 15.0 %    Eosinophil% 1.2 0.0 - 8.0 %    Basophil% 0.4 0.0 - 1.9 %    Differential Method Automated      CMP  Results for orders placed or performed during the hospital encounter of 06/13/19   Comprehensive metabolic panel   Result Value Ref Range    Sodium 139 136 - 145 mmol/L    Potassium 3.7 3.5 - 5.1 mmol/L    Chloride 104 95 - 110 mmol/L    CO2 27 23 - 29 mmol/L    Glucose 80 70 - 110 mg/dL    BUN, Bld 11 6 - 20 mg/dL    Creatinine 0.8 0.5 - 1.4 mg/dL    Calcium 9.1 8.7 - 10.5 mg/dL    Total Protein 6.9 6.0 - 8.4 g/dL    Albumin 3.9 3.5 - 5.2 g/dL    Total Bilirubin 0.4 0.1 - 1.0 mg/dL    Alkaline Phosphatase 78 55 - 135 U/L    AST 13 10 - 40 U/L    ALT 11 10 - 44 U/L    Anion Gap 8 8 - 16 mmol/L    eGFR if African American >60.0 >60 mL/min/1.73 m^2    eGFR if non African American >60.0 >60 mL/min/1.73 m^2     TSH  Results for orders placed or performed during the hospital encounter of 06/13/19   TSH   Result Value Ref Range    TSH 1.388 0.400 - 4.000 uIU/mL     ETOH  Results for orders placed or performed during the hospital encounter of 06/13/19   Ethanol   Result Value Ref Range    Alcohol, Medical, Serum <10 <10 mg/dL     Salicylate  No results found for this or any previous visit.  Acetaminophen  Results for orders placed or performed during the hospital encounter of 06/13/19   Acetaminophen level   Result Value Ref Range    Acetaminophen (Tylenol), Serum <3.0 (L) 10.0 - 20.0 ug/mL             Significant Imaging: none    Assessment/Plan:     Male-to-female transgender person    Being treated with HRT  Aldactone and estradiol per home doses- need to verify with pharmacy that patient was taking these doses prior to admission   Discussed with  Dr. 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 I will sign off. Please contact us if you have any additional questions.    Nani Harrison NP  Department of Hospital Medicine   [facility]
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
## 2019-06-14 NOTE — HPI
Nicola Valdez is a 23 y.o. male with a past psychiatric history of depression, currently admitted to the inpatient unit with the following chief complaint: suicidal ideation  UDS + THC  Apparently transitioning to female with HRT ; pt not very talkative and difficult to get information but reports was taking estradiol and aldactone   Patient is from [facility] and has been here for about six months.  She was homeless there so he came here.  She works as a  and lives with his girlfriend and her mother.  She was recently using methamphetamine and marijuana.

## 2019-06-15 PROCEDURE — 99233 PR SUBSEQUENT HOSPITAL CARE,LEVL III: ICD-10-PCS | Mod: SA,HB,S$PBB, | Performed by: PSYCHIATRY & NEUROLOGY

## 2019-06-15 PROCEDURE — 11400000 HC PSYCH PRIVATE ROOM

## 2019-06-15 PROCEDURE — 25000003 PHARM REV CODE 250: Performed by: PSYCHIATRY & NEUROLOGY

## 2019-06-15 PROCEDURE — 99233 SBSQ HOSP IP/OBS HIGH 50: CPT | Mod: SA,HB,S$PBB, | Performed by: PSYCHIATRY & NEUROLOGY

## 2019-06-15 RX ORDER — ESTRADIOL 0.5 MG/1
2 TABLET ORAL 3 TIMES DAILY
Status: DISCONTINUED | OUTPATIENT
Start: 2019-06-15 | End: 2019-06-19 | Stop reason: HOSPADM

## 2019-06-15 RX ORDER — SPIRONOLACTONE 25 MG/1
100 TABLET ORAL 3 TIMES DAILY
Status: DISCONTINUED | OUTPATIENT
Start: 2019-06-15 | End: 2019-06-19 | Stop reason: HOSPADM

## 2019-06-15 RX ADMIN — FLUOXETINE 20 MG: 20 CAPSULE ORAL at 08:06

## 2019-06-15 RX ADMIN — THERA TABS 1 TABLET: TAB at 08:06

## 2019-06-15 RX ADMIN — SPIRONOLACTONE 100 MG: 25 TABLET ORAL at 08:06

## 2019-06-15 RX ADMIN — ZIPRASIDONE HYDROCHLORIDE 40 MG: 40 CAPSULE ORAL at 04:06

## 2019-06-15 RX ADMIN — ZIPRASIDONE HYDROCHLORIDE 40 MG: 40 CAPSULE ORAL at 08:06

## 2019-06-15 RX ADMIN — FOLIC ACID 1 MG: 1 TABLET ORAL at 08:06

## 2019-06-15 RX ADMIN — ESTRADIOL 2 MG: 0.5 TABLET ORAL at 08:06

## 2019-06-15 NOTE — PLAN OF CARE
Problem: Adult Behavioral Health Plan of Care  Goal: Plan of Care Review  Outcome: Ongoing (interventions implemented as appropriate)  Pt.  Slept 8  Hours  so far this shift without problems noted. q 15 min safety checks done this shift. Safety and comfort maintained. Cont. Plan.

## 2019-06-15 NOTE — PLAN OF CARE
"Problem: Adult Behavioral Health Plan of Care  Goal: Develops/Participates in Therapeutic [place] to Support Successful Transition    Intervention: [facility]rapeutic [place]  Collateral Contact Note:      Patient's significant other was contacted and his name is Leonid Randle; he was reached at [phone]. Patient's significant other stated that he did not know why the patient came to the hospital; patient stated that she just needed to go the hospital for attention and help.     Patient's significant other stated that he is unaware of any previous outpatient or inpatient treatment dates; they have been together for only six months. Patient's significant other has only had depression for six months in the opinion; he agreed that he ha sonly known the patient for ix months however.     Patient's significant other stated categorically that he does not feel the patient has had any substance abuse issues. Patient's significant other stated that the patient has no legal issues. Patient checked herself in for help with depression he stated.     Patient's significant other stated that in his opinion there have been no suicide attempts in the past. Patient has no history of violence he stated.     There are no guns or weapons in the home.     Patient stays well if the patient is working and around the mother of the significant other. 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     Alejandro Maurice stated that he plans to  the patient when the patient is discharged and will assist the patient in getting to any after-care appointments scheduled through [facility].         "

## 2019-06-15 NOTE — PLAN OF CARE
"Problem: Adult Behavioral Health Plan of Care  Goal: Develops/Participates in Therapeutic [place] to Support Successful Transition    Intervention: Mutually Develop Transition Plan  Patient stated that she will go with [facility] for after-care; patient was given a choice and stated that she thinks she has gone to [facility] in the past but is unsure but will "consult" wit her spouse about it. Patient stated that she plans to get back to the staff when she obtains clarity. Patient continues to not want this counselor to contact her spouse who is Alejandro Randle whose phone number is [phone].             "

## 2019-06-15 NOTE — PSYCH
Reviewed patient's statement about attempting to commit suicide by hanging herself while at her mother's house last year and the patient's attempts at cutting her arm this morning while in her room. Dr. Channing MD met with the patient this morning to assess her progress in treatment.

## 2019-06-15 NOTE — PSYCH
At this time, patient refused to give permission to contact Robbin Randle, her significant other. Patient agreed to think about it and reconsider at another time. She wants her confidentiality maintained.
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
## 2019-06-15 NOTE — PROGRESS NOTES
PSYCHIATRY DAILY INPATIENT PROGRESS NOTE  SUBSEQUENT HOSPITAL VISIT    ENCOUNTER DATE: 6/15/2019  SITE: [facility]    DATE OF ADMISSION: 6/13/2019  6:52 PM  LENGTH OF STAY: 2 days      HISTORY    CHIEF COMPLAINT   Nicola Valdez is a 23 y.o. male, seen during daily watters rounds on the inpatient unit.  Nicola Valdez presents with the chief complaint of suicidal ideation    HPI   (Elements: Location, Quality, Severity, Duration, Timing, Content, Modifying Factors, Associated Signs & Symptoms)    The patient was seen and examined. The chart was reviewed.     Staff reports no behavioral or management issues.     The patient has been compliant with treatment. The patient denied any side effects.    This morning patient was found to have a marker in her room.  This was removed.  She was also seen doing self harm cuts with plastic from water.  We discussed this as not being a suicide attempt but poor cutting.  She did not read IHYDLM or Calm books.  We discussed need to be on 1:1.  She contracted for safety and I do not believe she was suicidal with her self harm behavior.      Endorses Symptoms of Depression: +diminished mood or loss of interest/anhedonia irritability, +diminished energy, change in sleep, change in appetite, +diminished concentration or cognition or indecisiveness, PMA/R, +excessive guilt or hopelessness or worthlessness, suicidal ideations     Endorses Changes in Sleep: +trouble with initiation, maintenance, early morning awakening with inability to return to sleep, hypersomnolence      Endorses Suicidal/Homicidal ideations: +active/passive ideations, organized plans, future intentions     Has had a plan to hang self     Symptoms of psychosis: hallucinations, delusions, disorganized thinking, disorganized behavior or abnormal motor behavior, or negative symptoms (diminshed emotional expression, avolition, anhedonia, alogia, asociality)      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 but  possibly MDD    Endorses Symptoms of JOVANI: excessive anxiety/worry/fear, more days than not, about numerous issues, difficult to control, with restlessness, fatigue, poor concentration, irritability, muscle tension, sleep disturbance; causes functionally impairing distress      Endorses Symptoms of Panic Disorder: recurrent panic attacks (palpitations/heart racing, sweating, shakiness, dyspnea, choking, chest pain/discomfort, Gi symptoms, dizzy/lightheadedness, hot/col flashes, paresthesias, derealization, fear of losing control or fear of dying), precipitated or un-precipitated, source of worry and/or behavioral changes secondary, +with or without agoraphobia           Endorses Symptoms of PTSD: +h/o trauma; +re-experiencing/intrusive symptoms, avoidant behavior, negative alterations in cognition or mood, hyperarousal symptoms; with or without dissociative symptoms      Denies Symptoms of Eating Disorders: anorexia, bulimia or binging     Endorses Substance Use: +Tolerance, +Withdrawal, Loss of control, +Social / Occupational Consequence,  +Risky / Continued Use, State of Change (pre-contemplative, +contemplative, preparation, action, maintenance, termination)  Does not believe marijuana is affecting her negatively but wants to stop meth     Borderline Personality Disorder Screen     Efforts to avoid real or imagined abandonment, Pattern of intense and unstable relationships, Distorted and unstable self-image or sense of self, Impulsive and often dangerous behaviors, Self harming, such as cutting, Recurring thoughts of suicidal behaviors or threats, Intense and highly changeable moods, Chronic feelings of emptiness, Inappropriate, intense anger or problems controlling anger, Difficulty trusting, fear of others intentions and Feelings of dissociation    ROS  Review of systems  Constitutional: No recent change in weight or fever  Musculoskeletal: No back, neck, muscle, or joint pain  Respiratory: No cough or shortness  "of breath  Cardiovascular: No chest pain, palpitations, or leg swelling  Gastrointestinal: No abdominal pain, nausea vomiting, or diarrhea  Genitourinary: No pain on urination  Neurologic: No dizziness, seizures, or headaches  Eyes: No change in vision  HENT: No congestion, hearing problem, tinnitus, dysphagia, or sore throat  Skin: No rash or wound    PAST MEDICAL HISTORY   Past Medical History:   Diagnosis Date    Anxiety 2008    Patient first got the diagnosis at age 13.     Depression 2018    Patient stated, "I received this diagnosis with [facility].     Hallucination 2018    Patient stated, "I was on drugs all the time, auditory hallucinations."     History of psychiatric hospitalization 2018    Was first hospitalized in [place], [place].     Hormone replacement therapy (HRT)     Hx of psychiatric care 2018    Patient was first put on wellbutrin and seroquel; was changed from wellutrin to celexa.    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     Sleep difficulties 2017    Been diagnosed with sleeping disturbance only six months ago.     Substance abuse 2017    Patient has a problem with meth at this time.     Suicide attempt 2018    First suicide attempt was last year.     Withdrawal symptoms, drug or narcotic 2017    Patient stated she has had [facility] issues with meth.            PSYCHOTROPIC MEDICATIONS   Scheduled Meds:   [START ON 6/17/2019] estradiol  2 mg Oral TID    FLUoxetine  20 mg Oral Daily    folic acid  1 mg Oral Daily    multivitamin  1 tablet Oral Daily    [START ON 6/17/2019] spironolactone  100 mg Oral TID    ziprasidone  40 mg Oral BID WM     Continuous Infusions:  PRN " "Meds:.acetaminophen, aluminum-magnesium hydroxide-simethicone, docusate sodium, hydrOXYzine pamoate, loperamide, nicotine, OLANZapine **AND** OLANZapine        EXAMINATION    VITALS   Vitals:    06/15/19 0900   BP: (!) 94/51   Pulse: 64   Resp: 18   Temp: 98.4 °F (36.9 °C)       CONSTITUTIONAL  General Appearance: Colored hair; NAD     MUSCULOSKELETAL  Muscle Strength and Tone:  normal  Abnormal Involuntary Movements:  none  Gait and Station:  normal; non-ataxic     PSYCHIATRIC   Level of Consciousness: awake, alert  Orientation: p/p/t/s  Grooming:  adequate to circumstances  Psychomotor Behavior: no PMA/R  Speech: nl r/t/v/s  Language: able to repeat words English fluent  Mood: "sad"  Affect: dysphoric  Thought Process:  linear and organized  Associations:  intact; no SOTO  Thought Content: +SI AH denied VH/delusions; denied HI  Memory:  intact to recent and remote events  Attention:  intact to conversation; not distractible   Fund of Knowledge:  age and education appropriate  Estimate if Intelligence:  average based on work/education history, vocabulary and mental status exam  Insight:  good- seeks help  Judgment:   good- no bx issues, compliant and cooperative        DIAGNOSTIC TESTING   Laboratory Results  No results found for this or any previous visit (from the past 24 hour(s)).      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  JOVANI  Panic with agoraphobia  PTSD  BPD     Polysubstance use including marijuana methamphetamine   Nicotine dependence     Transitioning to female with HRT    PROBLEM LIST AND MANAGEMENT PLANS        PRESCRIPTION DRUG MANAGEMENT  Compliance: yes  Side Effects: no  Regimen Adjustments:      Depression - Counseled, Restart Prozac 20mg QDay with plan to titrate higher, patient unaware of recent dose, Geodon 20mg BID with plan to titrate higher     Anxiety - Counseled, Prozac     PTSD - Counseled, Prozaac     BPD- [facility] book and " counseling     HRT - medicine consult reviewed, HRT medicine restarted     Polysubstance use including marijuana methamphetamine  - counseled with motivational interviewing, does not      Nicotine dependence - counseled, replacement    DISCHARGE PLANNING  -SW to assist with aftercare planning and collateral  -Once stable discharge home with outpatient follow up care and/or rehab  -Continue inpatient treatment under a PEC and/or CEC for danger to self, and danger to others, and grave disability as evident by voiced suicidal ideation in the context of mood personality and substance problems      NEED FOR CONTINUED HOSPITALIZATION  Psychiatric illness continues to pose a potential threat to life or bodily function, of self or others, thereby requiring the need for continued inpatient psychiatric hospitalization: Yes    Protective inpatient pyschiatric hospitalization required while a safe disposition plan is enacted: Yes    Patient stabilized and ready for discharge from inpatient psychiatric unit: No      STAFF:   Derian Snell MD  Psychiatry

## 2019-06-15 NOTE — PLAN OF CARE
Problem: Adult Behavioral Health Plan of Care  Goal: Plan of Care Review  Outcome: Ongoing (interventions implemented as appropriate)  Pt seen walking to day room with face covered.Pt using material from a paper scrub to cover her face.Pt reluctant to show her face while in bed.Pt tearful and not talking much.Pt noticed to have superficial scratches to left arm.When asked about this pt reports she used the plastic water container top to scratch self.Pt reports she doesn't like the way she looks.Pt with depressed hopeless affect.Dr Snell notified.

## 2019-06-15 NOTE — NURSING
"Received pt. In room, lying in bed. Pt. Report, mood as ,"ok". Minimal interaction at this time due to pt. Resting. No problems noted at this time,  "

## 2019-06-16 PROCEDURE — 99233 PR SUBSEQUENT HOSPITAL CARE,LEVL III: ICD-10-PCS | Mod: SA,HB,S$PBB, | Performed by: PSYCHIATRY & NEUROLOGY

## 2019-06-16 PROCEDURE — 25000003 PHARM REV CODE 250: Performed by: PSYCHIATRY & NEUROLOGY

## 2019-06-16 PROCEDURE — 99233 SBSQ HOSP IP/OBS HIGH 50: CPT | Mod: SA,HB,S$PBB, | Performed by: PSYCHIATRY & NEUROLOGY

## 2019-06-16 PROCEDURE — 11400000 HC PSYCH PRIVATE ROOM

## 2019-06-16 RX ORDER — FLUOXETINE HYDROCHLORIDE 20 MG/1
40 CAPSULE ORAL DAILY
Status: DISCONTINUED | OUTPATIENT
Start: 2019-06-16 | End: 2019-06-19 | Stop reason: HOSPADM

## 2019-06-16 RX ADMIN — SPIRONOLACTONE 100 MG: 25 TABLET ORAL at 03:06

## 2019-06-16 RX ADMIN — SPIRONOLACTONE 100 MG: 25 TABLET ORAL at 08:06

## 2019-06-16 RX ADMIN — ESTRADIOL 2 MG: 0.5 TABLET ORAL at 08:06

## 2019-06-16 RX ADMIN — ZIPRASIDONE HYDROCHLORIDE 40 MG: 40 CAPSULE ORAL at 07:06

## 2019-06-16 RX ADMIN — ESTRADIOL 2 MG: 0.5 TABLET ORAL at 03:06

## 2019-06-16 RX ADMIN — FOLIC ACID 1 MG: 1 TABLET ORAL at 08:06

## 2019-06-16 RX ADMIN — THERA TABS 1 TABLET: TAB at 08:06

## 2019-06-16 RX ADMIN — FLUOXETINE 40 MG: 20 CAPSULE ORAL at 08:06

## 2019-06-16 RX ADMIN — ZIPRASIDONE HYDROCHLORIDE 40 MG: 40 CAPSULE ORAL at 04:06

## 2019-06-16 RX ADMIN — DOCUSATE SODIUM 100 MG: 100 CAPSULE, LIQUID FILLED ORAL at 09:06

## 2019-06-16 NOTE — PLAN OF CARE
Problem: Adult Behavioral Health Plan of Care  Goal: Plan of Care Review  Outcome: Ongoing (interventions implemented as appropriate)  Pt out on unit off and on today.Pt reports sleeping well last night.Appetite good.Pt's grooming is poor.Pt not covering face today and no crying spells.Pt able to smile at times.Pt contracts for safety with staff.Denies any thoughts of hurting self.Medication compliant.

## 2019-06-16 NOTE — PROGRESS NOTES
PSYCHIATRY DAILY INPATIENT PROGRESS NOTE  SUBSEQUENT HOSPITAL VISIT    ENCOUNTER DATE: 6/16/2019  SITE: [facility]    DATE OF ADMISSION: 6/13/2019  6:52 PM  LENGTH OF STAY: 3 days      HISTORY    CHIEF COMPLAINT   Nicola Valdez is a 23 y.o. male, seen during daily watters rounds on the inpatient unit.  Nicola Valdez presents with the chief complaint of suicidal ideation    HPI   (Elements: Location, Quality, Severity, Duration, Timing, Content, Modifying Factors, Associated Signs & Symptoms)    The patient was seen and examined. The chart was reviewed.     Staff reports no behavioral or management issues.     The patient has been compliant with treatment. The patient denied any side effects.    Patient with no self harm last night but continues to have ideation.  She read some of the [facility] book and found it helpful.      Continued Symptoms of Depression: +diminished mood or loss of interest/anhedonia irritability, +diminished energy, change in sleep, change in appetite, +diminished concentration or cognition or indecisiveness, PMA/R, +excessive guilt or hopelessness or worthlessness, suicidal ideations     Continued Changes in Sleep: +trouble with initiation, maintenance, early morning awakening with inability to return to sleep, hypersomnolence      Continued Suicidal/Homicidal ideations: +active/passive ideations, organized plans, future intentions     Has had a plan to hang self     Symptoms of psychosis: hallucinations, delusions, disorganized thinking, disorganized behavior or abnormal motor behavior, or negative symptoms (diminshed emotional expression, avolition, anhedonia, alogia, asociality)      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 but possibly MDD    Continued Symptoms of JOVANI: excessive anxiety/worry/fear, more days than not, about numerous issues, difficult to control, with restlessness, fatigue, poor concentration, irritability, muscle tension, sleep disturbance; causes functionally  impairing distress      Continued Symptoms of Panic Disorder: recurrent panic attacks (palpitations/heart racing, sweating, shakiness, dyspnea, choking, chest pain/discomfort, Gi symptoms, dizzy/lightheadedness, hot/col flashes, paresthesias, derealization, fear of losing control or fear of dying), precipitated or un-precipitated, source of worry and/or behavioral changes secondary, +with or without agoraphobia           Continued Symptoms of PTSD: +h/o trauma; +re-experiencing/intrusive symptoms, avoidant behavior, negative alterations in cognition or mood, hyperarousal symptoms; with or without dissociative symptoms      Denies Symptoms of Eating Disorders: anorexia, bulimia or binging     Endorses Substance Use: +Tolerance, +Withdrawal, Loss of control, +Social / Occupational Consequence,  +Risky / Continued Use, State of Change (pre-contemplative, +contemplative, preparation, action, maintenance, termination)  Does not believe marijuana is affecting her negatively but wants to stop meth     Borderline Personality Disorder Screen     Efforts to avoid real or imagined abandonment, Pattern of intense and unstable relationships, Distorted and unstable self-image or sense of self, Impulsive and often dangerous behaviors, Self harming, such as cutting, Recurring thoughts of suicidal behaviors or threats, Intense and highly changeable moods, Chronic feelings of emptiness, Inappropriate, intense anger or problems controlling anger, Difficulty trusting, fear of others intentions and Feelings of dissociation    ROS  Review of systems  Constitutional: No recent change in weight or fever  Musculoskeletal: No back, neck, muscle, or joint pain  Respiratory: No cough or shortness of breath  Cardiovascular: No chest pain, palpitations, or leg swelling  Gastrointestinal: No abdominal pain, nausea vomiting, or diarrhea  Genitourinary: No pain on urination  Neurologic: No dizziness, seizures, or headaches  Eyes: No change in  "vision  HENT: No congestion, hearing problem, tinnitus, dysphagia, or sore throat  Skin: No rash or wound    PAST MEDICAL HISTORY   Past Medical History:   Diagnosis Date    Anxiety 2008    Patient first got the diagnosis at age 13.     Depression 2018    Patient stated, "I received this diagnosis with [facility].     Hallucination 2018    Patient stated, "I was on drugs all the time, auditory hallucinations."     History of psychiatric hospitalization 2018    Was first hospitalized in [place], [place].     Hormone replacement therapy (HRT)     Hx of psychiatric care 2018    Patient was first put on wellbutrin and seroquel; was changed from wellutrin to celexa.    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     Sleep difficulties 2017    Been diagnosed with sleeping disturbance only six months ago.     Substance abuse 2017    Patient has a problem with meth at this time.     Suicide attempt 2018    First suicide attempt was last year.     Withdrawal symptoms, drug or narcotic 2017    Patient stated she has had [facility] issues with meth.            PSYCHOTROPIC MEDICATIONS   Scheduled Meds:   estradiol  2 mg Oral TID    FLUoxetine  20 mg Oral Daily    folic acid  1 mg Oral Daily    multivitamin  1 tablet Oral Daily    spironolactone  100 mg Oral TID    ziprasidone  40 mg Oral BID WM     Continuous Infusions:  PRN Meds:.acetaminophen, aluminum-magnesium hydroxide-simethicone, docusate sodium, hydrOXYzine pamoate, loperamide, nicotine, OLANZapine **AND** OLANZapine        EXAMINATION    VITALS   Vitals:    06/16/19 0739   BP: 123/76   Pulse: 66   Resp: 16   Temp: 98.1 °F (36.7 °C) " "      CONSTITUTIONAL  General Appearance: Colored hair; NAD     MUSCULOSKELETAL  Muscle Strength and Tone:  normal  Abnormal Involuntary Movements:  none  Gait and Station:  normal; non-ataxic     PSYCHIATRIC   Level of Consciousness: awake, alert  Orientation: p/p/t/s  Grooming:  adequate to circumstances  Psychomotor Behavior: no PMA/R  Speech: nl r/t/v/s  Language: able to repeat words English fluent  Mood: "doing ok"  Affect: somewhat less dysphoric  Thought Process:  linear and organized  Associations:  intact; no SOTO  Thought Content: +SI AH denied VH/delusions; denied HI  Memory:  intact to recent and remote events  Attention:  intact to conversation; not distractible   Fund of Knowledge:  age and education appropriate  Estimate if Intelligence:  average based on work/education history, vocabulary and mental status exam  Insight:  good- seeks help  Judgment:   good- no bx issues, compliant and cooperative        DIAGNOSTIC TESTING   Laboratory Results  No results found for this or any previous visit (from the past 24 hour(s)).      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  JOVANI  Panic with agoraphobia  PTSD  BPD     Polysubstance use including marijuana methamphetamine   Nicotine dependence     Transitioning to female with HRT    PROBLEM LIST AND MANAGEMENT PLANS        PRESCRIPTION DRUG MANAGEMENT  Compliance: yes  Side Effects: no  Regimen Adjustments:      Depression - Increase Prozac 40mg QDay with plan to titrate higher, patient unaware of recent dose, Geodon 20mg BID with plan to titrate higher     Anxiety - Counseled, Prozac     PTSD - Counseled, Prozaac     BPD- [facility] book and counseling     HRT - medicine consult reviewed, HRT medicine restarted     Polysubstance use including marijuana methamphetamine  - counseled with motivational interviewing, does not      Nicotine dependence - counseled, replacement    DISCHARGE PLANNING  - to assist with " aftercare planning and collateral  -Once stable discharge home with outpatient follow up care and/or rehab  -Continue inpatient treatment under a PEC and/or CEC for danger to self, and danger to others, and grave disability as evident by voiced suicidal ideation in the context of mood personality and substance problems      NEED FOR CONTINUED HOSPITALIZATION  Psychiatric illness continues to pose a potential threat to life or bodily function, of self or others, thereby requiring the need for continued inpatient psychiatric hospitalization: Yes    Protective inpatient pyschiatric hospitalization required while a safe disposition plan is enacted: Yes    Patient stabilized and ready for discharge from inpatient psychiatric unit: No      STAFF:   Derian Snell MD  Psychiatry

## 2019-06-16 NOTE — PLAN OF CARE
"Problem: Adult Behavioral Health Plan of Care  Goal: Plan of Care Review  Outcome: Ongoing (interventions implemented as appropriate)  Received pt. Lying in bed at start of the shift with eyes closed. Pt. Awakened to assess for self harm. Noted superficial cuts to lt. Forearm. Pt. Remained isolative to her room. Pt. Spoke about being, "kicked" out by both parents  Homes, " I lived in the streets in [place] and prostituted myself out to survive." Pt. States she moved to [place] Due to being [place]. Pt. Does not have a plan yet on obtaining employment.  Self care and self love discussed. Pt. Also stated she does not like her face, that is why she covers it. Reassurance and support provided. Subject of self esteem esteem discussed. Pt. Agreed to contact staff if having thoughts to harm self. Compliant with meds. Pt.  Slept 9.5  Hours  so far this shift without problems noted. q 15 min safety checks done this shift.No self harm behavior noted. Safety and comfort maintained. Cont. Plan.      "

## 2019-06-17 LAB
BILIRUB UR QL STRIP: NEGATIVE
CLARITY UR: CLEAR
COLOR UR: YELLOW
GLUCOSE UR QL STRIP: NEGATIVE
HGB UR QL STRIP: NEGATIVE
KETONES UR QL STRIP: NEGATIVE
LEUKOCYTE ESTERASE UR QL STRIP: NEGATIVE
NITRITE UR QL STRIP: NEGATIVE
PH UR STRIP: 6 [PH] (ref 5–8)
PROT UR QL STRIP: NEGATIVE
SP GR UR STRIP: 1.01 (ref 1–1.03)
URN SPEC COLLECT METH UR: NORMAL
UROBILINOGEN UR STRIP-ACNC: NEGATIVE EU/DL

## 2019-06-17 PROCEDURE — 81003 URINALYSIS AUTO W/O SCOPE: CPT

## 2019-06-17 PROCEDURE — 99233 PR SUBSEQUENT HOSPITAL CARE,LEVL III: ICD-10-PCS | Mod: SA,HB,S$PBB, | Performed by: PSYCHIATRY & NEUROLOGY

## 2019-06-17 PROCEDURE — 11400000 HC PSYCH PRIVATE ROOM

## 2019-06-17 PROCEDURE — 99233 SBSQ HOSP IP/OBS HIGH 50: CPT | Mod: SA,HB,S$PBB, | Performed by: PSYCHIATRY & NEUROLOGY

## 2019-06-17 PROCEDURE — 25000003 PHARM REV CODE 250: Performed by: PSYCHIATRY & NEUROLOGY

## 2019-06-17 RX ADMIN — ESTRADIOL 2 MG: 0.5 TABLET ORAL at 02:06

## 2019-06-17 RX ADMIN — SPIRONOLACTONE 100 MG: 25 TABLET ORAL at 08:06

## 2019-06-17 RX ADMIN — FLUOXETINE 40 MG: 20 CAPSULE ORAL at 08:06

## 2019-06-17 RX ADMIN — ZIPRASIDONE HYDROCHLORIDE 40 MG: 40 CAPSULE ORAL at 04:06

## 2019-06-17 RX ADMIN — SPIRONOLACTONE 100 MG: 25 TABLET ORAL at 02:06

## 2019-06-17 RX ADMIN — ESTRADIOL 2 MG: 0.5 TABLET ORAL at 08:06

## 2019-06-17 RX ADMIN — FOLIC ACID 1 MG: 1 TABLET ORAL at 08:06

## 2019-06-17 RX ADMIN — ZIPRASIDONE HYDROCHLORIDE 40 MG: 40 CAPSULE ORAL at 07:06

## 2019-06-17 RX ADMIN — THERA TABS 1 TABLET: TAB at 08:06

## 2019-06-17 NOTE — NURSING
Pt. In room at start of the shift lying in bed. During one to one pt. Denied self harm thoughts, no self harm behavior noted. Mood and affect, brighter, was happy she had visitors today. Pt. Out for snack and HS meds. Pt. Was given clothes from [employer] bin, was thankful. No problems noted at this time.

## 2019-06-17 NOTE — PROGRESS NOTES
"   06/17/19 0229   [facility] Group Therapy   Group Name Other  (Expression All About Me, participated in 1:1 with staff)   Specific Interventions Skilled Activity Self-Expression   Participation Level Active   Participation Quality Cooperative   Insight/Motivation Good   Affect/Mood Display Appropriate   Cognition Alert   Psychomotor WNL   Patient shared that the following expressions about himself: I worry about "MY FACE", I dream about "MY ATTACKER", I am proud of "MY PARTNER", I am afraid of "LOOKING UGLY", I don't believe in "GENDER", I am good at "ART", I am poor at "BEING HAPPY", I feel like "crying." Patient chose to color coloring sheets after self-expressions activity.  "

## 2019-06-17 NOTE — PLAN OF CARE
Problem: Adult Behavioral Health Plan of Care  Goal: Plan of Care Review  Outcome: Ongoing (interventions implemented as appropriate)  Pt has no further episodes of self-injury.  Denies any current S/I or H/I.  Encouraged pt to report any self-injury impulses to staff immediately.  Pt is disheveled.  Pt verbalized understanding.  Will continue to monitor mood and behavior.

## 2019-06-17 NOTE — PROGRESS NOTES
PSYCHIATRY DAILY INPATIENT PROGRESS NOTE  SUBSEQUENT HOSPITAL VISIT    ENCOUNTER DATE: 6/17/2019  SITE: [facility]    DATE OF ADMISSION: 6/13/2019  6:52 PM  LENGTH OF STAY: 4 days      HISTORY    CHIEF COMPLAINT   Nicola Valdez is a 23 y.o. male, seen during daily watters rounds on the inpatient unit.  Nicola Valdez presents with the chief complaint of suicidal ideation    HPI   (Elements: Location, Quality, Severity, Duration, Timing, Content, Modifying Factors, Associated Signs & Symptoms)    The patient was seen and examined. The chart was reviewed.     Staff reports no behavioral or management issues.     The patient has been compliant with treatment. The patient denied any side effects.    Patient with no self harm last night but continues to have ideation.  She read some of the [facility] book and found it helpful.  She is now on chapter four.  Improved thoughts of self harm, no suicidal ideation in the past 24 hours.  She had a problem with urination yesterday but did not resport this to me today.      Improving Symptoms of Depression: less diminished mood or loss of interest/anhedonia irritability, improved diminished energy, change in sleep, change in appetite, +diminished concentration or cognition or indecisiveness, PMA/R, less excessive guilt or hopelessness or worthlessness, suicidal ideations     Continued Changes in Sleep: +trouble with initiation, maintenance, early morning awakening with inability to return to sleep, hypersomnolence      Resolving Suicidal/Homicidal ideations: less active/passive ideations, organized plans, future intentions     Has had a plan to hang self but no longer with plan to kill self or do self harm     Symptoms of psychosis: hallucinations, delusions, disorganized thinking, disorganized behavior or abnormal motor behavior, or negative symptoms (diminshed emotional expression, avolition, anhedonia, alogia, asociality)      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  but possibly MDD.  This has resolved     Improving Symptoms of JOVANI: less excessive anxiety/worry/fear, more days than not, about numerous issues, difficult to control, with restlessness, fatigue, poor concentration, irritability, muscle tension, sleep disturbance; causes functionally impairing distress      Continued Symptoms of Panic Disorder: recurrent panic attacks (palpitations/heart racing, sweating, shakiness, dyspnea, choking, chest pain/discomfort, Gi symptoms, dizzy/lightheadedness, hot/col flashes, paresthesias, derealization, fear of losing control or fear of dying), precipitated or un-precipitated, source of worry and/or behavioral changes secondary, +with or without agoraphobia           Continued Symptoms of PTSD: +h/o trauma; +re-experiencing/intrusive symptoms, avoidant behavior, negative alterations in cognition or mood, hyperarousal symptoms; with or without dissociative symptoms      Denies Symptoms of Eating Disorders: anorexia, bulimia or binging     Endorses Substance Use: +Tolerance, +Withdrawal, Loss of control, +Social / Occupational Consequence,  +Risky / Continued Use, State of Change (pre-contemplative, +contemplative, preparation, action, maintenance, termination)  Does not believe marijuana is affecting her negatively but wants to stop meth     Borderline Personality Disorder Screen     Efforts to avoid real or imagined abandonment, Pattern of intense and unstable relationships, Distorted and unstable self-image or sense of self, Impulsive and often dangerous behaviors, Self harming, such as cutting, Recurring thoughts of suicidal behaviors or threats, Intense and highly changeable moods, Chronic feelings of emptiness, Inappropriate, intense anger or problems controlling anger, Difficulty trusting, fear of others intentions and Feelings of dissociation    ROS  Review of systems  Constitutional: No recent change in weight or fever  Musculoskeletal: No back, neck, muscle, or joint  "pain  Respiratory: No cough or shortness of breath  Cardiovascular: No chest pain, palpitations, or leg swelling  Gastrointestinal: No abdominal pain, nausea vomiting, or diarrhea  Genitourinary: No pain on urination  Neurologic: No dizziness, seizures, or headaches  Eyes: No change in vision  HENT: No congestion, hearing problem, tinnitus, dysphagia, or sore throat  Skin: No rash or wound    PAST MEDICAL HISTORY   Past Medical History:   Diagnosis Date    Anxiety 2008    Patient first got the diagnosis at age 13.     Depression 2018    Patient stated, "I received this diagnosis with [facility].     Hallucination 2018    Patient stated, "I was on drugs all the time, auditory hallucinations."     History of psychiatric hospitalization 2018    Was first hospitalized in [place], [place].     Hormone replacement therapy (HRT)     Hx of psychiatric care 2018    Patient was first put on wellbutrin and seroquel; was changed from wellutrin to celexa.    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     Sleep difficulties 2017    Been diagnosed with sleeping disturbance only six months ago.     Substance abuse 2017    Patient has a problem with meth at this time.     Suicide attempt 2018    First suicide attempt was last year.     Withdrawal symptoms, drug or narcotic 2017    Patient stated she has had [facility] issues with meth.            PSYCHOTROPIC MEDICATIONS   Scheduled Meds:   estradiol  2 mg Oral TID    FLUoxetine  40 mg Oral Daily    folic acid  1 mg Oral Daily    multivitamin  1 tablet Oral Daily    spironolactone  100 mg Oral TID    ziprasidone  40 mg Oral BID WM     Continuous Infusions:  PRN " "Meds:.acetaminophen, aluminum-magnesium hydroxide-simethicone, docusate sodium, hydrOXYzine pamoate, loperamide, nicotine, OLANZapine **AND** OLANZapine        EXAMINATION    VITALS   Vitals:    06/17/19 0751   BP: (!) 117/55   Pulse: 70   Resp: 18   Temp: 98.2 °F (36.8 °C)       CONSTITUTIONAL  General Appearance: Colored hair; NAD     MUSCULOSKELETAL  Muscle Strength and Tone:  normal  Abnormal Involuntary Movements:  none  Gait and Station:  normal; non-ataxic     PSYCHIATRIC   Level of Consciousness: awake, alert  Orientation: p/p/t/s  Grooming:  adequate to circumstances  Psychomotor Behavior: no PMA/R  Speech: nl r/t/v/s  Language: able to repeat words English fluent  Mood: "better"  Affect: less dysphoric, improved range  Thought Process:  linear and organized  Associations:  intact; no SOTO  Thought Content: no SI no AH denied VH/delusions; denied HI  Memory:  intact to recent and remote events  Attention:  intact to conversation; not distractible   Fund of Knowledge:  age and education appropriate  Estimate if Intelligence:  average based on work/education history, vocabulary and mental status exam  Insight:  good- seeks help  Judgment:   good- no bx issues, compliant and cooperative        DIAGNOSTIC TESTING   Laboratory Results  No results found for this or any previous visit (from the past 24 hour(s)).      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  JOVANI  Panic with agoraphobia  PTSD  BPD     Polysubstance use including marijuana methamphetamine   Nicotine dependence     Transitioning to female with HRT    PROBLEM LIST AND MANAGEMENT PLANS        PRESCRIPTION DRUG MANAGEMENT  Compliance: yes  Side Effects: no  Regimen Adjustments:      Depression - Increase Prozac 40mg QDay with plan to titrate higher, patient unaware of recent dose, Geodon 20mg BID with plan to titrate higher     Anxiety - Counseled, Prozac     PTSD - Counseled, Prozaac     BPD- [facility] " book and counseling     HRT - medicine consult reviewed, HRT medicine restarted     Polysubstance use including marijuana methamphetamine  - counseled with motivational interviewing, does not      Nicotine dependence - counseled, replacement    DISCHARGE PLANNING  -SW to assist with aftercare planning and collateral  -Once stable discharge home with outpatient follow up care and/or rehab  -Continue inpatient treatment under a PEC and/or CEC for danger to self, and danger to others, and grave disability as evident by voiced suicidal ideation in the context of mood personality and substance problems      NEED FOR CONTINUED HOSPITALIZATION  Psychiatric illness continues to pose a potential threat to life or bodily function, of self or others, thereby requiring the need for continued inpatient psychiatric hospitalization: Yes    Protective inpatient pyschiatric hospitalization required while a safe disposition plan is enacted: Yes    Patient stabilized and ready for discharge from inpatient psychiatric unit: No      STAFF:   Derian Snell MD  Psychiatry

## 2019-06-17 NOTE — PLAN OF CARE
Problem: Adult Behavioral Health Plan of Care  Goal: Plan of Care Review  Outcome: Ongoing (interventions implemented as appropriate)  Pt.  Slept 7.5  Hours  so far this shift. No further complaints verbalized.. q 15 min safety checks done this shift. Safety and comfort maintained. Cont. Plan.

## 2019-06-17 NOTE — PLAN OF CARE
"Problem: Adult Behavioral Health Plan of Care  Goal: Optimized Coping Skills in Response to Life Stressors    Intervention: Promote Effective Coping Strategies  Group Note        Behavior    Patient attended group psychotherapy today. Patient exhibited depressed mood with appropriate affect.       Intervention     CBT-based group psychotherapy today focused on the development of internal coping strategies to confront depression, addiction or behavior unbecoming of a patient who desires to carry out life principles.         Response    Patient stated, "I want to pass please." Patient was allowed to pass; he stated he would prefer to listen to the discussion. Patient did exhibit good listening skills. Patient was attentive throughout the session. Patient was able to clap appropriately and on cue during the affirmation component of group today. While the patient stated he wanted to pass, he approached this counselor and apologized for not wanting to participate in the role playing scenario; stated he was not ready. Patient was excused, as per his request, from participating in the role playing scenario. Patient was very polite and respect as well as very attentive when the other group members gave their presentations.         Plan    Patient will be invited to continue to attend group psychotherapy sessions with continued focused attention on internal coping strategies.         "

## 2019-06-17 NOTE — NURSING
"Pt. Noted in bathroom several times. Pt. C/o difficulty urinating, having to"strain" Pt. Report having problems for approx one week and thought she might have a UTI. Pt. Was able to void/ empty bladder. Pt. Informed doctor would be made aware of it.  "

## 2019-06-18 PROCEDURE — 25000003 PHARM REV CODE 250: Performed by: PSYCHIATRY & NEUROLOGY

## 2019-06-18 PROCEDURE — 99233 SBSQ HOSP IP/OBS HIGH 50: CPT | Mod: SA,HB,S$PBB, | Performed by: PSYCHIATRY & NEUROLOGY

## 2019-06-18 PROCEDURE — 11400000 HC PSYCH PRIVATE ROOM

## 2019-06-18 PROCEDURE — 99233 PR SUBSEQUENT HOSPITAL CARE,LEVL III: ICD-10-PCS | Mod: SA,HB,S$PBB, | Performed by: PSYCHIATRY & NEUROLOGY

## 2019-06-18 RX ORDER — ZIPRASIDONE HYDROCHLORIDE 40 MG/1
40 CAPSULE ORAL
Status: DISCONTINUED | OUTPATIENT
Start: 2019-06-18 | End: 2019-06-19 | Stop reason: HOSPADM

## 2019-06-18 RX ADMIN — ESTRADIOL 2 MG: 0.5 TABLET ORAL at 08:06

## 2019-06-18 RX ADMIN — DOCUSATE SODIUM 100 MG: 100 CAPSULE, LIQUID FILLED ORAL at 06:06

## 2019-06-18 RX ADMIN — SPIRONOLACTONE 100 MG: 25 TABLET ORAL at 02:06

## 2019-06-18 RX ADMIN — ESTRADIOL 2 MG: 0.5 TABLET ORAL at 02:06

## 2019-06-18 RX ADMIN — ZIPRASIDONE HYDROCHLORIDE 40 MG: 40 CAPSULE ORAL at 04:06

## 2019-06-18 RX ADMIN — THERA TABS 1 TABLET: TAB at 08:06

## 2019-06-18 RX ADMIN — ZIPRASIDONE HYDROCHLORIDE 40 MG: 40 CAPSULE ORAL at 08:06

## 2019-06-18 RX ADMIN — SPIRONOLACTONE 100 MG: 25 TABLET ORAL at 08:06

## 2019-06-18 RX ADMIN — FOLIC ACID 1 MG: 1 TABLET ORAL at 08:06

## 2019-06-18 RX ADMIN — FLUOXETINE 40 MG: 20 CAPSULE ORAL at 08:06

## 2019-06-18 NOTE — PROGRESS NOTES
PSYCHIATRY DAILY INPATIENT PROGRESS NOTE  SUBSEQUENT HOSPITAL VISIT    ENCOUNTER DATE: 6/18/2019  SITE: [facility]    DATE OF ADMISSION: 6/13/2019  6:52 PM  LENGTH OF STAY: 5 days      HISTORY    CHIEF COMPLAINT   Nicola Valdez is a 23 y.o. male, seen during daily watters rounds on the inpatient unit.  Nicola Valdez presents with the chief complaint of suicidal ideation    HPI   (Elements: Location, Quality, Severity, Duration, Timing, Content, Modifying Factors, Associated Signs & Symptoms)    The patient was seen and examined. The chart was reviewed.     Staff reports no behavioral or management issues.     The patient has been compliant with treatment. The patient denied any side effects.    Patient without self harm the past 48 hours.  She read through [employer] and is now reading the calm book.  These books seem to be helpful and she is responding to therapy.  She remains isolative and guarded while affect is improving.      Improving Symptoms of Depression: less diminished mood or loss of interest/anhedonia irritability, improved diminished energy, change in sleep, change in appetite, +diminished concentration or cognition or indecisiveness, PMA/R, less excessive guilt or hopelessness or worthlessness, suicidal ideations     Continued Changes in Sleep: +trouble with initiation, maintenance, early morning awakening with inability to return to sleep, hypersomnolence      Resolving Suicidal/Homicidal ideations: less active/passive ideations, organized plans, future intentions     Has had a plan to hang self but no longer with plan to kill self or do self harm     Symptoms of psychosis: hallucinations, delusions, disorganized thinking, disorganized behavior or abnormal motor behavior, or negative symptoms (diminshed emotional expression, avolition, anhedonia, alogia, asociality)      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 but possibly MDD.  This has resolved     Improving Symptoms of JOVANI: less  excessive anxiety/worry/fear, more days than not, about numerous issues, difficult to control, with restlessness, fatigue, poor concentration, irritability, muscle tension, sleep disturbance; causes functionally impairing distress      Continued Symptoms of Panic Disorder: recurrent panic attacks (palpitations/heart racing, sweating, shakiness, dyspnea, choking, chest pain/discomfort, Gi symptoms, dizzy/lightheadedness, hot/col flashes, paresthesias, derealization, fear of losing control or fear of dying), precipitated or un-precipitated, source of worry and/or behavioral changes secondary, +with or without agoraphobia    Continued Symptoms of PTSD: +h/o trauma; +re-experiencing/intrusive symptoms, avoidant behavior, negative alterations in cognition or mood, hyperarousal symptoms; with or without dissociative symptoms      Denies Symptoms of Eating Disorders: anorexia, bulimia or binging     Endorses Substance Use: +Tolerance, +Withdrawal, Loss of control, +Social / Occupational Consequence,  +Risky / Continued Use, State of Change (pre-contemplative, +contemplative, preparation, action, maintenance, termination)  Does not believe marijuana is affecting her negatively but wants to stop meth     Borderline Personality Disorder Screen     Efforts to avoid real or imagined abandonment, Pattern of intense and unstable relationships, Distorted and unstable self-image or sense of self, Impulsive and often dangerous behaviors, Self harming, such as cutting, Recurring thoughts of suicidal behaviors or threats, Intense and highly changeable moods, Chronic feelings of emptiness, Inappropriate, intense anger or problems controlling anger, Difficulty trusting, fear of others intentions and Feelings of dissociation    ROS  Review of systems  Constitutional: No recent change in weight or fever  Musculoskeletal: No back, neck, muscle, or joint pain  Respiratory: No cough or shortness of breath  Cardiovascular: No chest pain,  "palpitations, or leg swelling  Gastrointestinal: No abdominal pain, nausea vomiting, or diarrhea  Genitourinary: No pain on urination  Neurologic: No dizziness, seizures, or headaches  Eyes: No change in vision  HENT: No congestion, hearing problem, tinnitus, dysphagia, or sore throat  Skin: No rash or wound    PAST MEDICAL HISTORY   Past Medical History:   Diagnosis Date    Anxiety 2008    Patient first got the diagnosis at age 13.     Depression 2018    Patient stated, "I received this diagnosis with [facility].     Hallucination 2018    Patient stated, "I was on drugs all the time, auditory hallucinations."     History of psychiatric hospitalization 2018    Was first hospitalized in [place], [place].     Hormone replacement therapy (HRT)     Hx of psychiatric care 2018    Patient was first put on wellbutrin and seroquel; was changed from wellutrin to celexa.    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     Sleep difficulties 2017    Been diagnosed with sleeping disturbance only six months ago.     Substance abuse 2017    Patient has a problem with meth at this time.     Suicide attempt 2018    First suicide attempt was last year.     Withdrawal symptoms, drug or narcotic 2017    Patient stated she has had [facility] issues with meth.            PSYCHOTROPIC MEDICATIONS   Scheduled Meds:   estradiol  2 mg Oral TID    FLUoxetine  40 mg Oral Daily    folic acid  1 mg Oral Daily    multivitamin  1 tablet Oral Daily    spironolactone  100 mg Oral TID    ziprasidone  40 mg Oral BID WM     Continuous Infusions:  PRN Meds:.acetaminophen, aluminum-magnesium hydroxide-simethicone, docusate sodium, " "hydrOXYzine pamoate, loperamide, nicotine, OLANZapine **AND** OLANZapine        EXAMINATION    VITALS   Vitals:    06/18/19 0748   BP: 123/63   Pulse: 75   Resp: 18   Temp: 96.7 °F (35.9 °C)       CONSTITUTIONAL  General Appearance: Colored hair; NAD     MUSCULOSKELETAL  Muscle Strength and Tone:  normal  Abnormal Involuntary Movements:  none  Gait and Station:  normal; non-ataxic     PSYCHIATRIC   Level of Consciousness: awake, alert  Orientation: p/p/t/s  Grooming:  adequate to circumstances  Psychomotor Behavior: no PMA/R  Speech: nl r/t/v/s  Language: able to repeat words English fluent  Mood: "pretty good"  Affect: less dysphoric, improved range  Thought Process:  linear and organized  Associations:  intact; no SOTO  Thought Content: no SI no AH denied VH/delusions; denied HI  Memory:  intact to recent and remote events  Attention:  intact to conversation; not distractible   Fund of Knowledge:  age and education appropriate  Estimate if Intelligence:  average based on work/education history, vocabulary and mental status exam  Insight:  good- seeks help  Judgment:   good- no bx issues, compliant and cooperative        DIAGNOSTIC TESTING   Laboratory Results  Recent Results (from the past 24 hour(s))   Urinalysis    Collection Time: 06/17/19 12:15 PM   Result Value Ref Range    Specimen UA Urine, Clean Catch     Color, UA Yellow Yellow, Straw, Adali    Appearance, UA Clear Clear    pH, UA 6.0 5.0 - 8.0    Specific Gravity, UA 1.010 1.005 - 1.030    Protein, UA Negative Negative    Glucose, UA Negative Negative    Ketones, UA Negative Negative    Bilirubin (UA) Negative Negative    Occult Blood UA Negative Negative    Nitrite, UA Negative Negative    Urobilinogen, UA Negative <2.0 EU/dL    Leukocytes, UA Negative Negative         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  JOVANI  Panic with agoraphobia  PTSD  BPD     Polysubstance use including marijuana " methamphetamine   Nicotine dependence     Transitioning to female with HRT    PROBLEM LIST AND MANAGEMENT PLANS        PRESCRIPTION DRUG MANAGEMENT  Compliance: yes  Side Effects: no  Regimen Adjustments:      Depression - Increase Prozac 40mg QDay with plan to titrate higher,  Move geodon to just in the evening     Anxiety - Counseled, Prozac     PTSD - Counseled, Prozaac     BPD- [facility] book and counseling     HRT - medicine consult reviewed, HRT medicine restarted     Polysubstance use including marijuana methamphetamine  - counseled with motivational interviewing, does not      Nicotine dependence - counseled, replacement    DISCHARGE PLANNING  - to assist with aftercare planning and collateral  -Once stable discharge home with outpatient follow up care and/or rehab  -Continue inpatient treatment under a PEC and/or CEC for danger to self, and danger to others, and grave disability as evident by voiced suicidal ideation in the context of mood personality and substance problems      NEED FOR CONTINUED HOSPITALIZATION  Psychiatric illness continues to pose a potential threat to life or bodily function, of self or others, thereby requiring the need for continued inpatient psychiatric hospitalization: Yes    Protective inpatient pyschiatric hospitalization required while a safe disposition plan is enacted: Yes    Patient stabilized and ready for discharge from inpatient psychiatric unit: No      STAFF:   Derian Snell MD  Psychiatry

## 2019-06-18 NOTE — PROGRESS NOTES
"   06/18/19 1520   [facility] Group Therapy   Group Name Leisure Education   Specific Interventions Coping Skills Training  (Benefits Of Leisure Activities)   Participation Level Active;Sharing   Participation Quality Cooperative   Insight/Motivation Good   Affect/Mood Display Appropriate   Cognition Alert   Psychomotor WNL   Patient participated in a 1:1 with staff. Patient states "benefits are something that does you good." Patient says "I have anxiety being in a group because my face, I want to shave." Patient list benefits for relaxation is "art", to be physically fit "socialize", for mental stimulation"games", for accomplishment "gardening."  "

## 2019-06-18 NOTE — PLAN OF CARE
Problem: Adult Behavioral Health Plan of Care  Goal: Plan of Care Review  Outcome: Ongoing (interventions implemented as appropriate)  Pt calm and cooperating, had good visit with boyfriend and smiled some, interacting better with staff and peers, although does isolate to room some, likes to draw and is creative, med compliant, denies SI at this time, safety precautions maintained will continue to monitor

## 2019-06-18 NOTE — PLAN OF CARE
Problem: Adult Behavioral Health Plan of Care  Goal: Plan of Care Review  Outcome: Ongoing (interventions implemented as appropriate)  Pt remains depressed.  Denies any S/i or H/i at this time.  No self-injury behaviors reported.  Pt does state depression feels like it's getting worse by being here.  Nurse manager gave pt some diversional activities to do.  Encouraged pt inform staff if intrusive thoughts are worsening, understanding verbalized.  Will continue to monitor.

## 2019-06-18 NOTE — PLAN OF CARE
Problem: Adult Behavioral Health Plan of Care  Goal: Plan of Care Review  Outcome: Ongoing (interventions implemented as appropriate)  Lying quiet in bed, eyes closed, respiration even and unlabored, appearing asleep.  Slept well most all shift except for one awakening at about 0030 with c/o being cold.  Adjusted room temperature and given another blanket.  Noted back to sleep bu 0100 without further c/o.  Safety and precautions maintained with rounds every 15 minutes, bed is fixed in a low position and pathways kept clear.  No fall occurred.

## 2019-06-18 NOTE — PLAN OF CARE
Problem: Adult Behavioral Health Plan of Care  Goal: Optimized Coping Skills in Response to Life Stressors    Intervention: Promote Effective Coping Strategies  Group Note        Behavior    Patient attended group psychotherapy today. Patient exhibited depressed mood with appropriate affect.         Intervention     CBT-based group psychotherapy with focused attention on internal coping strategies as well as external coping strategies.       Response    Patient agreed, before the session began, that she wanted to focus on just listening; she announced during check-in time that she wanted to pass. Patient's request to want to only listen was respected. Patient appeared to exhibit very good listening skills.         Plan    Patient will be encouraged to continue to attend group psychotherapy with focused attention on continue work on helping her improve in her listening skills.

## 2019-06-19 VITALS
HEART RATE: 68 BPM | TEMPERATURE: 97 F | HEIGHT: 67 IN | DIASTOLIC BLOOD PRESSURE: 75 MMHG | RESPIRATION RATE: 18 BRPM | SYSTOLIC BLOOD PRESSURE: 117 MMHG | WEIGHT: 162.25 LBS | BODY MASS INDEX: 25.47 KG/M2

## 2019-06-19 PROBLEM — F33.3 SEVERE EPISODE OF RECURRENT MAJOR DEPRESSIVE DISORDER, WITH PSYCHOTIC FEATURES: Status: ACTIVE | Noted: 2019-06-13

## 2019-06-19 PROCEDURE — 99239 HOSP IP/OBS DSCHRG MGMT >30: CPT | Mod: AF,HB,S$PBB, | Performed by: PSYCHIATRY & NEUROLOGY

## 2019-06-19 PROCEDURE — 99239 PR HOSPITAL DISCHARGE DAY,>30 MIN: ICD-10-PCS | Mod: AF,HB,S$PBB, | Performed by: PSYCHIATRY & NEUROLOGY

## 2019-06-19 PROCEDURE — 25000003 PHARM REV CODE 250: Performed by: PSYCHIATRY & NEUROLOGY

## 2019-06-19 RX ORDER — FLUOXETINE HYDROCHLORIDE 40 MG/1
40 CAPSULE ORAL DAILY
Qty: 30 CAPSULE | Refills: 1 | Status: SHIPPED | OUTPATIENT
Start: 2019-06-20 | End: 2019-08-19

## 2019-06-19 RX ORDER — ZIPRASIDONE HYDROCHLORIDE 40 MG/1
40 CAPSULE ORAL
Qty: 30 CAPSULE | Refills: 1 | Status: SHIPPED | OUTPATIENT
Start: 2019-06-19 | End: 2019-08-18

## 2019-06-19 RX ORDER — IBUPROFEN 200 MG
1 TABLET ORAL DAILY
COMMUNITY
Start: 2019-06-19

## 2019-06-19 RX ADMIN — FLUOXETINE 40 MG: 20 CAPSULE ORAL at 08:06

## 2019-06-19 RX ADMIN — SPIRONOLACTONE 100 MG: 25 TABLET ORAL at 08:06

## 2019-06-19 RX ADMIN — ESTRADIOL 2 MG: 0.5 TABLET ORAL at 08:06

## 2019-06-19 RX ADMIN — FOLIC ACID 1 MG: 1 TABLET ORAL at 08:06

## 2019-06-19 RX ADMIN — THERA TABS 1 TABLET: TAB at 08:06

## 2019-06-19 NOTE — PSYCH
"Group Note        Behavior    Patient attended group psychotherapy. He needed re-direction several times this morning as he tended to want to discuss his discharge; anticipatory anxiety concerning his discharge was severe.       Intervention     Patient was asked to participate in a calming exercise involving mindfulness.    CBT-based group psychotherapy reviewed with the patients the one thing that is most important to them and worth living for; patients were encouraged to state how solitude may be able to help them take their mind off their problems without contacting another person.       Response     Patient acknowledged after the session that his anxiety had decreased significantly as a result of the mindfulness exercise. Patient agreed that the concept was "very good about solitude"; however he continued to be overly excited about his discharge and had difficulty paying attention.         Plan    Patient will be prepared for discharge; continues to state that he has someone coming to pick him up today and needs no assistance in contacting them.           "

## 2019-06-19 NOTE — PLAN OF CARE
Problem: Adult Behavioral Health Plan of Care  Goal: Plan of Care Review  Outcome: Ongoing (interventions implemented as appropriate)  Pt is resting at this time and has slept 7 hours intermittently.  NAD.  Resp even & unlabored.  Pathways clear and bed in low position.  Q 15 minute safety checks ongoing.  All precautions maintained

## 2019-06-19 NOTE — PSYCH
Patient will be following up with [facility] in [place], [phone].  Patient will walk in on 6/20/2019 between 8 and 3 PM.  Patient will receive tobacco cessation therapy along with substance abuse therapy due to a positive UDS on admit.  AVS faxed on 6/19/2019 at 11:56 am.

## 2019-06-19 NOTE — PSYCH
Discharge Note:      Patient would prefer he stated for this counselor to no longer contact anyone else for a collateral contact. Patient's stated that his spouse will come and pick him up this morning. Patient was given a copy of his safety plan. Patient will be prepared for discharge.       Plan    Patient was told about the services of the [facility]. He was given the following information:    [address] [address]  Walk-in times are Monday through Friday 8:00 am to 3:30 pm.       Patient will be prepared for discharge. An attempt is being made at this time to secure a more definite appointment date and time.

## 2019-06-19 NOTE — DISCHARGE SUMMARY
Discharge Summary  Psychiatry    Admit Date: 6/13/2019    Discharge Date and Time:  06/19/2019 8:16 AM    Attending Physician: Derian Snell MD     Discharge Provider: Derian Snell    Reason for Admission:  Suicidal ideation    History of Present Illness:   Patient is from [facility] and has been here for about six months.  She was homeless there so he came here.  She works as a  and lives with his girlfriend and her mother.  She was recently using methamphetamine and marijuana.       Current Medication: Prozac      Endorses Symptoms of Depression: +diminished mood or loss of interest/anhedonia irritability, +diminished energy, change in sleep, change in appetite, +diminished concentration or cognition or indecisiveness, PMA/R, +excessive guilt or hopelessness or worthlessness, suicidal ideations     Endorses Changes in Sleep: +trouble with initiation, maintenance, early morning awakening with inability to return to sleep, hypersomnolence      Endorses Suicidal/Homicidal ideations: +active/passive ideations, organized plans, future intentions     Has had a plan to hang self     Symptoms of psychosis: hallucinations, delusions, disorganized thinking, disorganized behavior or abnormal motor behavior, or negative symptoms (diminshed emotional expression, avolition, anhedonia, alogia, asociality)      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 but possibly MDD     Denies Symptoms of mary lou or hypomania: elevated, expansive, or irritable mood with increased energy or activity; with inflated self-esteem or grandiosity, decreased need for sleep, increased rate of speech, FOI or racing thoughts, distractibility, increased goal directed activity or PMA, risky/disinhibited behavior     Has had manic experiences but while on stimulants     Endorses Symptoms of JOVANI: excessive anxiety/worry/fear, more days than not, about numerous issues, difficult to control, with restlessness, fatigue,  poor concentration, irritability, muscle tension, sleep disturbance; causes functionally impairing distress      Endorses Symptoms of Panic Disorder: recurrent panic attacks (palpitations/heart racing, sweating, shakiness, dyspnea, choking, chest pain/discomfort, Gi symptoms, dizzy/lightheadedness, hot/col flashes, paresthesias, derealization, fear of losing control or fear of dying), precipitated or un-precipitated, source of worry and/or behavioral changes secondary, +with or without agoraphobia           Endorses Symptoms of PTSD: +h/o trauma; +re-experiencing/intrusive symptoms, avoidant behavior, negative alterations in cognition or mood, hyperarousal symptoms; with or without dissociative symptoms      Denies Symptoms of Eating Disorders: anorexia, bulimia or binging     Endorses Substance Use: +Tolerance, +Withdrawal, Loss of control, +Social / Occupational Consequence,  +Risky / Continued Use, State of Change (pre-contemplative, +contemplative, preparation, action, maintenance, termination)  Does not believe marijuana is affecting her negatively but wants to stop meth     Borderline Personality Disorder Screen     Efforts to avoid real or imagined abandonment, Pattern of intense and unstable relationships, Distorted and unstable self-image or sense of self, Impulsive and often dangerous behaviors, Self harming, such as cutting, Recurring thoughts of suicidal behaviors or threats, Intense and highly changeable moods, Chronic feelings of emptiness, Inappropriate, intense anger or problems controlling anger, Difficulty trusting, fear of others intentions and Feelings of dissociation    Procedures Performed: * No surgery found *    Hospital Course (synopsis of major diagnoses, care, treatment, and services provided during the course of the hospital stay):   The patient was stabilized and discharged on the following medications:  Geodon 40mg with dinner offlabel for depression  Prozac 40mg QDay for depression      The patient was compliant with treatment. The patient denied any side effects.     Patient did well on unit.  Her mood improved throughout her stay and she showed mastery over the impulse to self harm.      Discussed diagnosis, risks and benefits of proposed treatment vs alternative treatments vs no treatment, and potential side effects of these treatments.  The patient expresses understanding of the above and displays the capacity to agree with this treatment given said understanding.  Patient also agrees that, currently, the benefits outweigh the risks and would like to pursue treatment at this time.    MSE: stated age, casually dressed, well groomed.  No psychomotor agitation or retardation.  No abnormal involuntary movements.  Gait normal.  Speech normal, conversational.  Language fluent English. Mood fine.  Affect normal range, pleasant, euthymic.  Thought process linear.  Associations intact.  Denies suicidal or homicidal ideation.  Denies auditory hallucinations, paranoid ideation, ideas of reference.  Memory intact.  Attention intact.  Fund of knowledge intact.  Insight intact.  Judgment intact.  Alert and oriented to person, place, time.      Tobacco Usage:  Is patient a smoker? Yes  Does patient want prescription for Tobacco Cessation? No  Does patient want counseling for Tobacco Cessation? No    If patient would like to quit, then over the counter nicotine patch could be used. The patient could also follow up with his PCP or psychiatric provider for other alternatives.     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   Secondary Diagnoses:   JOVANI  Panic with agoraphobia  PTSD  BPD     Polysubstance use including marijuana methamphetamine   Nicotine dependence     Transitioning to female with HRT    Labs:  Admission on 06/13/2019   Component Date Value Ref Range Status    Cholesterol 06/14/2019 161  120 - 199 mg/dL Final    Triglycerides 06/14/2019  76  30 - 150 mg/dL Final    HDL 06/14/2019 56  40 - 75 mg/dL Final    LDL Cholesterol 06/14/2019 89.8  63.0 - 159.0 mg/dL Final    Hdl/Cholesterol Ratio 06/14/2019 34.8  20.0 - 50.0 % Final    Total Cholesterol/HDL Ratio 06/14/2019 2.9  2.0 - 5.0 Final    Non-HDL Cholesterol 06/14/2019 105  mg/dL Final    Hemoglobin A1C 06/14/2019 5.2  4.0 - 5.6 % Final    Estimated Avg Glucose 06/14/2019 103  68 - 131 mg/dL Final    Specimen UA 06/17/2019 Urine, Clean Catch   Final    Color, UA 06/17/2019 Yellow  Yellow, Straw, Adali Final    Appearance, UA 06/17/2019 Clear  Clear Final    pH, UA 06/17/2019 6.0  5.0 - 8.0 Final    Specific Gravity, UA 06/17/2019 1.010  1.005 - 1.030 Final    Protein, UA 06/17/2019 Negative  Negative Final    Glucose, UA 06/17/2019 Negative  Negative Final    Ketones, UA 06/17/2019 Negative  Negative Final    Bilirubin (UA) 06/17/2019 Negative  Negative Final    Occult Blood UA 06/17/2019 Negative  Negative Final    Nitrite, UA 06/17/2019 Negative  Negative Final    Urobilinogen, UA 06/17/2019 Negative  <2.0 EU/dL Final    Leukocytes, UA 06/17/2019 Negative  Negative Final   Admission on 06/13/2019, Discharged on 06/13/2019   Component Date Value Ref Range Status    WBC 06/13/2019 8.30  3.90 - 12.70 K/uL Final    RBC 06/13/2019 4.99  4.60 - 6.20 M/uL Final    Hemoglobin 06/13/2019 14.7  14.0 - 18.0 g/dL Final    Hematocrit 06/13/2019 43.7  40.0 - 54.0 % Final    Mean Corpuscular Volume 06/13/2019 88  82 - 98 fL Final    Mean Corpuscular Hemoglobin 06/13/2019 29.5  27.0 - 31.0 pg Final    Mean Corpuscular Hemoglobin Conc 06/13/2019 33.6  32.0 - 36.0 g/dL Final    RDW 06/13/2019 13.0  11.5 - 14.5 % Final    Platelets 06/13/2019 237  150 - 350 K/uL Final    MPV 06/13/2019 10.7  9.2 - 12.9 fL Final    Immature Granulocytes 06/13/2019 0.2  0.0 - 0.5 % Final    Gran # (ANC) 06/13/2019 4.5  1.8 - 7.7 K/uL Final    Immature Grans (Abs) 06/13/2019 0.02  0.00 - 0.04 K/uL Final     Lymph # 06/13/2019 3.1  1.0 - 4.8 K/uL Final    Mono # 06/13/2019 0.6  0.3 - 1.0 K/uL Final    Eos # 06/13/2019 0.1  0.0 - 0.5 K/uL Final    Baso # 06/13/2019 0.03  0.00 - 0.20 K/uL Final    nRBC 06/13/2019 0  0 /100 WBC Final    Gran% 06/13/2019 54.4  38.0 - 73.0 % Final    Lymph% 06/13/2019 36.7  18.0 - 48.0 % Final    Mono% 06/13/2019 7.1  4.0 - 15.0 % Final    Eosinophil% 06/13/2019 1.2  0.0 - 8.0 % Final    Basophil% 06/13/2019 0.4  0.0 - 1.9 % Final    Differential Method 06/13/2019 Automated   Final    Sodium 06/13/2019 139  136 - 145 mmol/L Final    Potassium 06/13/2019 3.7  3.5 - 5.1 mmol/L Final    Chloride 06/13/2019 104  95 - 110 mmol/L Final    CO2 06/13/2019 27  23 - 29 mmol/L Final    Glucose 06/13/2019 80  70 - 110 mg/dL Final    BUN, Bld 06/13/2019 11  6 - 20 mg/dL Final    Creatinine 06/13/2019 0.8  0.5 - 1.4 mg/dL Final    Calcium 06/13/2019 9.1  8.7 - 10.5 mg/dL Final    Total Protein 06/13/2019 6.9  6.0 - 8.4 g/dL Final    Albumin 06/13/2019 3.9  3.5 - 5.2 g/dL Final    Total Bilirubin 06/13/2019 0.4  0.1 - 1.0 mg/dL Final    Alkaline Phosphatase 06/13/2019 78  55 - 135 U/L Final    AST 06/13/2019 13  10 - 40 U/L Final    ALT 06/13/2019 11  10 - 44 U/L Final    Anion Gap 06/13/2019 8  8 - 16 mmol/L Final    eGFR if African American 06/13/2019 >60.0  >60 mL/min/1.73 m^2 Final    eGFR if non African American 06/13/2019 >60.0  >60 mL/min/1.73 m^2 Final    TSH 06/13/2019 1.388  0.400 - 4.000 uIU/mL Final    Specimen UA 06/13/2019 Urine, Clean Catch   Final    Color, UA 06/13/2019 Yellow  Yellow, Straw, Adali Final    Appearance, UA 06/13/2019 Clear  Clear Final    pH, UA 06/13/2019 5.0  5.0 - 8.0 Final    Specific [place], UA 06/13/2019 1.010  1.005 - 1.030 Final    Protein, UA 06/13/2019 Negative  Negative Final    Glucose, UA 06/13/2019 Negative  Negative Final    Ketones, UA 06/13/2019 Negative  Negative Final    Bilirubin (UA) 06/13/2019 Negative   Negative Final    Occult Blood UA 06/13/2019 Negative  Negative Final    Nitrite, UA 06/13/2019 Negative  Negative Final    Leukocytes, UA 06/13/2019 Trace* Negative Final    Benzodiazepines 06/13/2019 Negative   Final    Methadone metabolites 06/13/2019 Negative   Final    Cocaine (Metab.) 06/13/2019 Negative   Final    Opiate Scrn, Ur 06/13/2019 Negative   Final    Barbiturate Screen, Ur 06/13/2019 Negative   Final    Amphetamine Screen, Ur 06/13/2019 Negative   Final    THC 06/13/2019 Presumptive Positive   Final    Phencyclidine 06/13/2019 Negative   Final    Creatinine, Random Ur 06/13/2019 105.0  23.0 - 375.0 mg/dL Final    Toxicology Information 06/13/2019 SEE COMMENT   Final    Alcohol, Medical, Serum 06/13/2019 <10  <10 mg/dL Final    Acetaminophen (Tylenol), Serum 06/13/2019 <3.0* 10.0 - 20.0 ug/mL Final    RBC, UA 06/13/2019 0  0 - 4 /hpf Final    WBC, UA 06/13/2019 8* 0 - 5 /hpf Final    Bacteria 06/13/2019 Few* None-Occ /hpf Final    Squam Epithel, UA 06/13/2019 3  /hpf Final    Microscopic Comment 06/13/2019 SEE COMMENT   Final         Discharged Condition: stable and improved; not currently a danger to self/others or gravely disabled    Disposition: Home or Self Care    Is patient being discharged on multiple neuroleptics? No    Follow Up/Patient Instructions:     Medications:  Reconciled Home Medications:      Medication List      START taking these medications    nicotine 14 mg/24 hr  Commonly known as:  NICODERM CQ  Place 1 patch onto the skin once daily.     ziprasidone 40 MG Cap  Commonly known as:  GEODON  Take 1 capsule (40 mg total) by mouth daily with dinner or evening meal.        CHANGE how you take these medications    FLUoxetine 40 MG capsule  Take 1 capsule (40 mg total) by mouth once daily.  Start taking on:  6/20/2019  What changed:  Another medication with the same name was removed. Continue taking this medication, and follow the directions you see here.         CONTINUE taking these medications    estradiol 1 MG tablet  Commonly known as:  ESTRACE  Take 2 mg by mouth 3 (three) times daily.     SPIRONOLACTONE ORAL  Take 100 mg by mouth 3 (three) times daily.        STOP taking these medications    QUEtiapine 25 MG Tab  Commonly known as:  SEROQUEL          Discharge Procedure Orders   Diet Adult Regular     Notify your health care provider if you experience any of the following:   Order Comments: Suicidal ideation     Activity as tolerated           Diet: regular     Activity as tolerated    Total time spent discharging patient: 32 minutes    Derian Snell MD  Psychiatry

## 2019-06-19 NOTE — NURSING
Discharge  Note : avs reviewed with patient and she expresses understanding . Denies suicidal , homicidal ideations and is not gravely disabled . Friend is here to pick her up . Has all belongings .

## 2019-06-19 NOTE — PLAN OF CARE
Problem: Adult Behavioral Health Plan of Care  Goal: Plan of Care Review  Outcome: Ongoing (interventions implemented as appropriate)  Pt elated at times , inappropriate affect, speaks softly, out on unit coloring at times, showered, interacting good with staff and peers